# Patient Record
Sex: MALE | Race: WHITE | NOT HISPANIC OR LATINO | Employment: FULL TIME | URBAN - METROPOLITAN AREA
[De-identification: names, ages, dates, MRNs, and addresses within clinical notes are randomized per-mention and may not be internally consistent; named-entity substitution may affect disease eponyms.]

---

## 2017-07-25 ENCOUNTER — CLINICAL SUPPORT (OUTPATIENT)
Dept: OTHER | Facility: CLINIC | Age: 57
End: 2017-07-25
Payer: COMMERCIAL

## 2017-07-25 VITALS
DIASTOLIC BLOOD PRESSURE: 84 MMHG | HEIGHT: 73 IN | SYSTOLIC BLOOD PRESSURE: 126 MMHG | BODY MASS INDEX: 37.77 KG/M2 | WEIGHT: 285 LBS

## 2017-07-25 DIAGNOSIS — Z00.8 HEALTH EXAMINATION IN POPULATION SURVEYS: Primary | ICD-10-CM

## 2017-07-25 LAB
GLUCOSE SERPL-MCNC: 145 MG/DL (ref 60–140)
HBA1C MFR BLD: 7.9 % (ref 0–5.7)
POC CHOLESTEROL, HDL: 31 MG/DL (ref 40–?)
POC CHOLESTEROL, LDL: 113 MG/DL (ref ?–160)
POC CHOLESTEROL, TOTAL: 173 MG/DL (ref ?–240)
POC GLUCOSE FASTING: ABNORMAL MG/DL (ref 60–110)
POC TOTAL CHOLESTEROL / HDL RATIO: 5.58 (ref ?–6)
POC TRIGLYCERIDES: 147 MG/DL (ref ?–160)

## 2017-07-25 PROCEDURE — 82947 ASSAY GLUCOSE BLOOD QUANT: CPT | Mod: QW,S$GLB,, | Performed by: INTERNAL MEDICINE

## 2017-07-25 PROCEDURE — 99401 PREV MED CNSL INDIV APPRX 15: CPT | Mod: S$GLB,,, | Performed by: INTERNAL MEDICINE

## 2017-07-25 PROCEDURE — 83036 HEMOGLOBIN GLYCOSYLATED A1C: CPT | Mod: QW,S$GLB,, | Performed by: INTERNAL MEDICINE

## 2017-07-25 PROCEDURE — 80061 LIPID PANEL: CPT | Mod: QW,S$GLB,, | Performed by: INTERNAL MEDICINE

## 2024-01-19 ENCOUNTER — TELEPHONE (OUTPATIENT)
Dept: TRANSPLANT | Facility: CLINIC | Age: 64
End: 2024-01-19
Payer: COMMERCIAL

## 2024-01-22 ENCOUNTER — TELEPHONE (OUTPATIENT)
Dept: TRANSPLANT | Facility: CLINIC | Age: 64
End: 2024-01-22
Payer: COMMERCIAL

## 2024-01-24 ENCOUNTER — TELEPHONE (OUTPATIENT)
Dept: TRANSPLANT | Facility: CLINIC | Age: 64
End: 2024-01-24
Payer: COMMERCIAL

## 2024-03-05 ENCOUNTER — TELEPHONE (OUTPATIENT)
Dept: TRANSPLANT | Facility: CLINIC | Age: 64
End: 2024-03-05
Payer: COMMERCIAL

## 2024-03-05 DIAGNOSIS — Z76.82 ORGAN TRANSPLANT CANDIDATE: Primary | ICD-10-CM

## 2024-03-05 DIAGNOSIS — Z91.89 CARDIOVASCULAR EVENT RISK: ICD-10-CM

## 2024-03-05 NOTE — TELEPHONE ENCOUNTER
Returned call, unable to leave voice message at number provided.  ----- Message from Jessie Valdez sent at 3/5/2024  8:19 AM CST -----  Regarding: speak to Nurse  Contact: PT  wife   The patient wife called requesting to speak to Nurse regarding wife being initial point of contact. PT struggling with some of the issues with diagnosis, etc. Please call at your earliest convenience     No further information provided    Patient wife can be contacted @# [phone]

## 2024-03-08 ENCOUNTER — TELEPHONE (OUTPATIENT)
Dept: TRANSPLANT | Facility: CLINIC | Age: 64
End: 2024-03-08
Payer: COMMERCIAL

## 2024-03-14 ENCOUNTER — HOSPITAL ENCOUNTER (OUTPATIENT)
Dept: RADIOLOGY | Facility: HOSPITAL | Age: 64
Discharge: HOME | End: 2024-03-14
Payer: COMMERCIAL

## 2024-03-14 ENCOUNTER — OFFICE VISIT (OUTPATIENT)
Dept: TRANSPLANT | Facility: CLINIC | Age: 64
End: 2024-03-14
Payer: COMMERCIAL

## 2024-03-14 ENCOUNTER — DOCUMENTATION ONLY (OUTPATIENT)
Dept: TRANSPLANT | Facility: CLINIC | Age: 64
End: 2024-03-14

## 2024-03-14 VITALS
HEART RATE: 56 BPM | BODY MASS INDEX: 35.28 KG/M2 | RESPIRATION RATE: 18 BRPM | TEMPERATURE: 98 F | SYSTOLIC BLOOD PRESSURE: 134 MMHG | DIASTOLIC BLOOD PRESSURE: 63 MMHG | OXYGEN SATURATION: 99 % | WEIGHT: 252 LBS | HEIGHT: 71 IN

## 2024-03-14 DIAGNOSIS — N18.4 ANEMIA IN STAGE 4 CHRONIC KIDNEY DISEASE: Chronic | ICD-10-CM

## 2024-03-14 DIAGNOSIS — Z76.82 ORGAN TRANSPLANT CANDIDATE: ICD-10-CM

## 2024-03-14 DIAGNOSIS — N18.4 BENIGN HYPERTENSION WITH CKD (CHRONIC KIDNEY DISEASE) STAGE IV: ICD-10-CM

## 2024-03-14 DIAGNOSIS — N25.81 SECONDARY HYPERPARATHYROIDISM OF RENAL ORIGIN: Chronic | ICD-10-CM

## 2024-03-14 DIAGNOSIS — Z86.718 HISTORY OF DVT (DEEP VEIN THROMBOSIS): ICD-10-CM

## 2024-03-14 DIAGNOSIS — E08.22 DIABETES MELLITUS DUE TO UNDERLYING CONDITION WITH STAGE 4 CHRONIC KIDNEY DISEASE, WITH LONG-TERM CURRENT USE OF INSULIN: ICD-10-CM

## 2024-03-14 DIAGNOSIS — Z86.711 HISTORY OF PULMONARY EMBOLISM: ICD-10-CM

## 2024-03-14 DIAGNOSIS — Z01.818 PRE-TRANSPLANT EVALUATION FOR CHRONIC KIDNEY DISEASE: Primary | ICD-10-CM

## 2024-03-14 DIAGNOSIS — I12.9 BENIGN HYPERTENSION WITH CKD (CHRONIC KIDNEY DISEASE) STAGE IV: ICD-10-CM

## 2024-03-14 DIAGNOSIS — E78.2 MIXED HYPERLIPIDEMIA: ICD-10-CM

## 2024-03-14 DIAGNOSIS — I73.9 PVD (PERIPHERAL VASCULAR DISEASE): ICD-10-CM

## 2024-03-14 DIAGNOSIS — I48.0 PAROXYSMAL ATRIAL FIBRILLATION: ICD-10-CM

## 2024-03-14 DIAGNOSIS — N18.4 CHRONIC KIDNEY DISEASE, STAGE 4 (SEVERE): Chronic | ICD-10-CM

## 2024-03-14 DIAGNOSIS — E66.09 CLASS 1 OBESITY DUE TO EXCESS CALORIES WITH SERIOUS COMORBIDITY AND BODY MASS INDEX (BMI) OF 34.0 TO 34.9 IN ADULT: ICD-10-CM

## 2024-03-14 DIAGNOSIS — N18.4 DIABETES MELLITUS DUE TO UNDERLYING CONDITION WITH STAGE 4 CHRONIC KIDNEY DISEASE, WITH LONG-TERM CURRENT USE OF INSULIN: ICD-10-CM

## 2024-03-14 DIAGNOSIS — Z79.4 DIABETES MELLITUS DUE TO UNDERLYING CONDITION WITH STAGE 4 CHRONIC KIDNEY DISEASE, WITH LONG-TERM CURRENT USE OF INSULIN: ICD-10-CM

## 2024-03-14 DIAGNOSIS — D63.1 ANEMIA IN STAGE 4 CHRONIC KIDNEY DISEASE: Chronic | ICD-10-CM

## 2024-03-14 PROBLEM — N18.9 ANEMIA IN CHRONIC KIDNEY DISEASE: Chronic | Status: ACTIVE | Noted: 2023-07-10

## 2024-03-14 PROBLEM — E66.811 CLASS 1 OBESITY DUE TO EXCESS CALORIES WITH SERIOUS COMORBIDITY AND BODY MASS INDEX (BMI) OF 34.0 TO 34.9 IN ADULT: Status: ACTIVE | Noted: 2024-03-14

## 2024-03-14 PROBLEM — R60.0 PERIPHERAL EDEMA: Status: ACTIVE | Noted: 2019-09-03

## 2024-03-14 PROBLEM — R80.9 PROTEINURIA: Status: ACTIVE | Noted: 2019-09-03

## 2024-03-14 PROCEDURE — 93978 VASCULAR STUDY: CPT | Mod: TC,TXP

## 2024-03-14 PROCEDURE — 93978 VASCULAR STUDY: CPT | Mod: 26,TXP,, | Performed by: RADIOLOGY

## 2024-03-14 PROCEDURE — 72170 X-RAY EXAM OF PELVIS: CPT | Mod: TC,TXP

## 2024-03-14 PROCEDURE — 76770 US EXAM ABDO BACK WALL COMP: CPT | Mod: TC,TXP

## 2024-03-14 PROCEDURE — 71046 X-RAY EXAM CHEST 2 VIEWS: CPT | Mod: TC,TXP

## 2024-03-14 PROCEDURE — 99999 PR PBB SHADOW E&M-EST. PATIENT-LVL IV: CPT | Mod: PBBFAC,TXP,, | Performed by: NURSE PRACTITIONER

## 2024-03-14 PROCEDURE — 1160F RVW MEDS BY RX/DR IN RCRD: CPT | Mod: CPTII,S$GLB,TXP, | Performed by: NURSE PRACTITIONER

## 2024-03-14 PROCEDURE — 3078F DIAST BP <80 MM HG: CPT | Mod: CPTII,S$GLB,TXP, | Performed by: NURSE PRACTITIONER

## 2024-03-14 PROCEDURE — 99204 OFFICE O/P NEW MOD 45 MIN: CPT | Mod: S$GLB,TXP,, | Performed by: PHYSICIAN ASSISTANT

## 2024-03-14 PROCEDURE — 76770 US EXAM ABDO BACK WALL COMP: CPT | Mod: 26,TXP,, | Performed by: RADIOLOGY

## 2024-03-14 PROCEDURE — 3044F HG A1C LEVEL LT 7.0%: CPT | Mod: CPTII,S$GLB,TXP, | Performed by: NURSE PRACTITIONER

## 2024-03-14 PROCEDURE — 72170 X-RAY EXAM OF PELVIS: CPT | Mod: 26,TXP,, | Performed by: RADIOLOGY

## 2024-03-14 PROCEDURE — 99204 OFFICE O/P NEW MOD 45 MIN: CPT | Mod: S$GLB,TXP,, | Performed by: TRANSPLANT SURGERY

## 2024-03-14 PROCEDURE — 97802 MEDICAL NUTRITION INDIV IN: CPT | Mod: S$GLB,TXP,,

## 2024-03-14 PROCEDURE — 3008F BODY MASS INDEX DOCD: CPT | Mod: CPTII,S$GLB,TXP, | Performed by: NURSE PRACTITIONER

## 2024-03-14 PROCEDURE — 71046 X-RAY EXAM CHEST 2 VIEWS: CPT | Mod: 26,TXP,, | Performed by: RADIOLOGY

## 2024-03-14 PROCEDURE — 1159F MED LIST DOCD IN RCRD: CPT | Mod: CPTII,S$GLB,TXP, | Performed by: NURSE PRACTITIONER

## 2024-03-14 PROCEDURE — 3075F SYST BP GE 130 - 139MM HG: CPT | Mod: CPTII,S$GLB,TXP, | Performed by: NURSE PRACTITIONER

## 2024-03-14 PROCEDURE — 99205 OFFICE O/P NEW HI 60 MIN: CPT | Mod: S$GLB,TXP,, | Performed by: NURSE PRACTITIONER

## 2024-03-14 RX ORDER — SERTRALINE HYDROCHLORIDE 50 MG/1
1 TABLET, FILM COATED ORAL DAILY
COMMUNITY
Start: 2023-09-14 | End: 2025-02-07

## 2024-03-14 RX ORDER — FUROSEMIDE 40 MG/1
40 TABLET ORAL DAILY
COMMUNITY
Start: 2023-06-05

## 2024-03-14 RX ORDER — APIXABAN 5 MG/1
5 TABLET, FILM COATED ORAL 2 TIMES DAILY
COMMUNITY

## 2024-03-14 RX ORDER — INSULIN LISPRO 100 [IU]/ML
INJECTION, SOLUTION INTRAVENOUS; SUBCUTANEOUS
COMMUNITY
Start: 2023-07-13

## 2024-03-14 RX ORDER — AMLODIPINE BESYLATE 5 MG/1
5 TABLET ORAL DAILY
COMMUNITY

## 2024-03-14 RX ORDER — TRAZODONE HYDROCHLORIDE 50 MG/1
1 TABLET ORAL NIGHTLY
COMMUNITY
Start: 2024-02-08

## 2024-03-14 NOTE — PROGRESS NOTES
INITIAL PATIENT EDUCATION NOTE     Mr. Bhupendra Rojas was seen in pre-kidney transplant clinic for evaluation for kidney, kidney/pancreas or pancreas only transplant.  The patient attended an individual video education session that discussed/reviewed the following aspects of transplantation: evaluation including diagnostic and laboratory testing,(Chemistries, Hematology, Serologies including HIV and Hepatitis and HLA) required for transplantation and selection committee process, UNOS waitlist management/multiple listings, types of organs offered (KDPI < 85%, KDPI > 85%, PHS risk, DCD, HCV+, HIV+ for HIV+ recipients and enbloc/dual), financial aspects, surgical procedures, dietary instruction pre- and post-transplant, health maintenance pre- and post-transplant, post-transplant hospitalization and outpatient follow-up, potential to participate in a research protocol, and medication management and side effects.  A question and answer session was provided after the presentation.    The patient was seen by all members of the multi-disciplinary team to include: Nephrologist/TRACEY, Surgeon, , Transplant Coordinator, , Pharmacist and Dietician (if applicable).    The patient reviewed and signed all consents for evaluation which were witnessed and sent to scanning into the [facility] chart.    The patient was given an education book and plan for further evaluation based on his individual assessment.      Reviewed program requirement for complete COVID vaccination with documentation prior to listing.  COVID education information reviewed with patient. Patient encouraged to be up to date on all vaccinations.     The patient was informed that the transplant team would manage immediate post op pain. If the patient requires long term pain management, they will need to have that pain management addressed by their PCP or previous provider who wrote for long term pain medicines.    The patient was encouraged  to call with any questions or concerns.

## 2024-03-14 NOTE — PROGRESS NOTES
PHARM.D. PRE-TRANSPLANT NOTE:    This patient's medication therapy was evaluated as part of his pre-transplant evaluation.      The following general pharmacologic concerns were noted: Eliquis for hx of thrombus and AFib    The following concerns for post-operative pain management were noted: none    The following pharmacologic concerns related to HCV therapy were noted: AFib hx      This patient's medication profile was reviewed for considerations for DAA Hepatitis C therapy:    [x]  No current inducers of CYP 3A4 or PGP  [x]  No amiodarone on this patient's EMR profile in the last 24 months  [YES]  No past or current atrial fibrillation on this patient's EMR profile       Current Outpatient Medications   Medication Sig Dispense Refill    amLODIPine (NORVASC) 5 MG tablet Take 5 mg by mouth once daily.      ELIQUIS 5 mg Tab Take 5 mg by mouth 2 (two) times daily.      furosemide (LASIX) 40 MG tablet Take 40 mg by mouth once daily.      HUMALOG KWIKPEN INSULIN 100 unit/mL pen Sliding scale      sertraline (ZOLOFT) 50 MG tablet Take 1 tablet by mouth once daily.      traZODone (DESYREL) 50 MG tablet Take 1 tablet by mouth every evening.       No current facility-administered medications for this visit.           I am available for consultation and can be contacted, as needed by the other members of the Transplant team.

## 2024-03-14 NOTE — PROGRESS NOTES
Transplant Recipient Adult Psychosocial Assessment    SW completed assessment with patient and pt's wife Leigh Rojas in clinic.    Bhupendra Rojas  [address]  [facility] [address]  Telephone Information:   Mobile [phone]   Home  [phone] (home)  Work  There is no work phone number on file.  E-mail  [email or web address]    Sex: male  YOB: 1960  Age: 63 y.o.    Encounter Date: 3/14/2024  [place] Citizen: yes  Primary Language: English   Needed: no    Emergency Contact:  Name: Leigh Rojas  Relationship: wife  Address: same as patient  Phone Numbers: [phone] (mobile)    Family/Social Support:   Number of dependents/: Patient denies any dependents.  Marital history: Patient reports being  to wife Leigh for 34 years.  Other family dynamics: Patient's parents are .  Patient has 2 sisters who are supportive - one lives in [place], [place] and one lives in [place], [place].  Patient has 2 adult daughters who both live with patient and are available to provide secondary caregiver support.    Household Composition:  Name: Jj Rojas  Age: 61 & 59  Relationship: patient and wife  Does person drive? yes    Name: Shahrzad Rojas  Age: 32 & 29  Relationship: daughters  Does person drive? yes    Name: Cristasocrates Rosenbergley ([phone])  Age: 31  Relationship:  son-in-law  Does person drive? yes    Do you and your caregivers have access to reliable transportation? yes  PRIMARY CAREGIVER: Leigh Rojas, pt's wife, will be primary caregiver, phone number [phone].      provided in-depth information to patient and caregiver regarding pre- and post-transplant caregiver role.   strongly encourages patient and caregiver to have concrete plan regarding post-transplant care giving, including back-up caregiver(s) to ensure care giving needs are met as needed.    Patient and Caregiver states understanding all aspects of caregiver  role/commitment and is able/willing/committed to being caregiver to the fullest extent necessary.    Patient and Caregiver verbalizes understanding of the education provided today and caregiver responsibilities.         remains available. Patient and Caregiver agree to contact  in a timely manner if concerns arise.      Able to take time off work without financial concerns: yes.     Additional Significant Others who will Assist with Transplant:  Name: Avril Rojas ([phone])  Age: 32  City:  State: [place]  Relationship: daughter  Does person drive? yes    Name: Radha Rojas ([phone])  Age: 31  City: [place] State: [place]  Relationship: daughter  Does person drive? yes    Living Will: yes  Healthcare Power of : no  Advance Directives on file: <<no information> per medical record.  Verbally reviewed LW/HCPA information.   provided patient with copy of LW/HCPA documents and provided education on completion of forms.    Living Donors: No. Education and resource information given to patient.    Highest Education Level: Associate/Bachelor Degree  Reading Ability: college  Reports difficulty with: N/A  Learns Best By:  multisensory information     Status: no  VA Benefits: no     Working for Income: No  If no, reason not working: Patient Choice - Retired  Patient is retired from working as a  at [employer] in [place].    Spouse/Significant Other Employment: Patient's wife Leigh works full-time (night shifts) as an .    Disabled: no    Monthly Income:  Salary/Wages: $1,600 (wife's employment income)  care home: $700 (patient's snf income)  Able to afford all costs now and if transplanted, including medications: yes - Patient and wife report having financial assistance available from children if needed.  Patient and Caregiver verbalizes understanding of personal responsibilities related to transplant costs and the  importance of having a financial plan to ensure that patients transplant costs are fully covered.      provided fundraising information/education.  Patient and Caregiver verbalizes understanding.   remains available.    Insurance:   Payer/Plan Subscr  Sex Relation Sub. Ins. ID Effective Group Num   1. BELIA CROSS BL* MODESTA JAIMES 1960 Male Self [id number]                                   [address] [address]     Primary Insurance (for UNOS reporting): Private Insurance - BCBS (via patient's jail benefit)  Secondary Insurance (for UNOS reporting): None  Patient and Caregiver verbalizes clear understanding that patient may experience difficulty obtaining and/or be denied insurance coverage post-surgery. This includes and is not limited to disability insurance, life insurance, health insurance, burial insurance, long term care insurance, and other insurances.    Patient and Caregiver also reports understanding that future health concerns related to or unrelated to transplantation may not be covered by patient's insurance.  Resources and information provided and reviewed.      Patient and Caregiver provides verbal permission to release any necessary information to outside resources for patient care and discharge planning.  Resources and information provided are reviewed.      Dialysis Adherence:  Patient reports being pre-dialysis.  Nephrology adherence form faxed to patient's nephrologist's office.  SW will await returned form to complete dialysis adherence check.    Infusion Service: patient utilizing? no  Home Health: patient utilizing? no  DME: yes - CPAP  Pulmonary/Cardiac Rehab: no   ADLS:  Pt reports being independent with all ADLs and mobility and driving himself.    Adherence:   Pt/caregiver reports pt has exhibited good adherence to medication regimen, appointment schedule, and medical recommendations in the past.  Adherence education and counseling  provided.     Per History Section:  Past Medical History:   Diagnosis Date    Atrial fibrillation     Deep vein thrombosis     Diabetes mellitus     Diabetes mellitus, type 2     Disorder of kidney and ureter     Empyema     Kidney stone     Onychomycosis     Paroxysmal atrial fibrillation     Personal history of colonic polyps     Pleural effusion     due to bacterial infection    Pulmonary embolism     Sleep apnea, unspecified     Solitary kidney, acquired     Tinea pedis     Both feet    Type 2 diabetes mellitus with unspecified diabetic retinopathy without macular edema      Social History     Tobacco Use    Smoking status: Never    Smokeless tobacco: Never   Substance Use Topics    Alcohol use: Not Currently     Social History     Substance and Sexual Activity   Drug Use Never     Social History     Substance and Sexual Activity   Sexual Activity Not Currently       Per Today's Psychosocial:  Tobacco: none, patient denies any use.  Alcohol: rare - glass of wine on special occasions  Illicit Drugs/Non-prescribed Medications: none, patient denies any use.    Patient and Caregiver states clear understanding of the potential impact of substance use as it relates to transplant candidacy and is aware of possible random substance screening.  Substance abstinence/cessation counseling, education and resources provided and reviewed.     Arrests/DWI/Treatment/Rehab: patient denies    Psychiatric History:    Mental Health: Patient reports experiencing anxiety and depression symptoms following medical snf from teaching, nephrectomy 2/2 kidney stones, and the loss of his father.  Patient reports deciding to seek help from  providers, and symptoms are now being adequately managed via medication and routine follow-up with psychiatrist and psychotherapist.  Psychiatrist/Counselor: Patient reports seeing psychiatrist Dr. James aLndry with [facility] for medication management every 3 months.  Pt also  sees  Jenn Carbajal LCSW with [facility] for psychotherapy every month.  Medications:  Patient reports currently taking Zoloft prescribed by psychiatrist.  Suicide/Homicide Issues: Pt denies any past and/or present SI/HI.   Safety at home: Pt denies having any past or present concerns regarding safety at home including but not limited to physical/emotional/sexual abuse, neglect, or exploitation.    Knowledge: Patient and Caregiver states having clear understanding and realistic expectations regarding the potential risks and potential benefits of organ transplantation and organ donation, agrees to discuss with health care team members and support system members, and to utilize available resources and express questions and/or concerns in order to further facilitate the pt informed decision-making.  Resources and information provided and reviewed.     Patient and Caregiver is aware of [facility]'s affiliation and/or partnership with agencies in home health care, LTAC, SNF, [facility], and other hospitals and clinics.    Understanding: Patient and Caregiver reports having a clear understanding of the many lifetime commitments involved with being a transplant recipient, including costs, compliance, medications, lab work, procedures, appointments, concrete and financial planning, preparedness, timely and appropriate communication of concerns, abstinence (ETOH, tobacco, illicit non-prescribed drugs), adherence to all health care team recommendations, support system and caregiver involvement, appropriate and timely resource utilization and follow-through, mental health counseling as needed/recommended, and patient and caregiver responsibilities.  Social Service Handbook, resources and detailed educational information provided and reviewed.  Educational information provided.    Patient and Caregiver also reports current and expected compliance with health care regime and states having a clear understanding of the  "importance of compliance.      Patient and Caregiver reports a clear understanding that risks and benefits may be involved with organ transplantation and with organ donation.      Patient and Caregiver also reports clear understanding that psychosocial risk factors may affect patient, and include but are not limited to feelings of depression, generalized anxiety, anxiety regarding dependence on others, post traumatic stress disorder, feelings of guilt and other emotional and/or mental concerns, and/or exacerbation of existing mental health concerns.  Detailed resources provided and discussed.     Patient and Caregiver agrees to access appropriate resources in a timely manner as needed and/or as recommended, and to communicate concerns appropriately.  Patient and Caregiver also reports a clear understanding of treatment options available.      reviewed education, provided additional information, and answered questions.    Feelings or Concerns: Pt expresses motivation to continue pursuing transplant and denies any transplant related concerns at this time.    Coping: Identify Patient & Caregiver Strategies to [place]:   1. In the past - playing [employer]; reading; activity in [employer]   2. Currently & Pre-transplant - watching [employer] videos; reading; tico/[employer] ([facility] [employer])   3. At the time of surgery - family support; tico   4. During post-Transplant & Recovery Period - all of the above    Goals: Patient reports post-transplant goals include improving physical activity / exercise, sleeping better, and returning to a more "normal" life.  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  Patient referred to Vocational Rehabilitation.    Interview Behavior: Patient and Caregiver presents as alert and oriented x 4, pleasant, good eye contact, well groomed, recall good, concentration/judgement good, average intelligence, communicative, cooperative, and asking and answering questions " appropriately.  Pt accompanied to clinic by wife Leigh who presents as highly supportive and actively involved in pt's current healthcare regimen.         Transplant Social Work - Candidacy  Assessment/Plan:     Psychosocial Suitability: Based on psychosocial risk factors, patient presents as  low risk candidate for kidney transplant at this time due to established caregiver plan, supportive family system, no reported history of substance abuse, adequate management of mental health concerns, healthy and effective coping strategies, adequate insurance coverage and personal finances to cover transplant cost, and realistic beliefs and expectations regarding risks and benefits of transplant.    Final determination of transplant candidacy to be made by Transplant Selection Committee.    Recommendations/Additional Comments: Nephrology adherence update is pending.  SW recommends that pt conduct fundraising to assist pt with pay for cost of medications, food, gas, and other transplant related needs.  SW recommends that pt remain aware of potential mental health concerns and contact the team if any concerns arise.  SW recommends that pt remain abstinent from tobacco, ETOH, and drug use.  SW supports pt's continued adherence. SW remains available to answer any questions or concerns that arise as the pt moves through the transplant process.     Mau Zambrano

## 2024-03-14 NOTE — LETTER
March 18, 2024        Lisset Hull  [address]  [place] [address]  Phone: [phone]  Fax: [phone]             Kade Corley- Transplant [facility]  [address]  [facility] [address]  Phone: [phone]   Patient: Bhupendra Rojas   MR Number: [id number]   YOB: 1960   Date of Visit: 3/14/2024       Dear Dr. Lisset Hull    Thank you for referring Bhupendra Rojas to me for evaluation. Attached you will find relevant portions of my assessment and plan of care.    If you have questions, please do not hesitate to call me. I look forward to following Bhupendra Rojas along with you.    Sincerely,    Claudia Panchal, NP    Enclosure    If you would like to receive this communication electronically, please contact [email or web address] or [phone] to request [facility] [employer] access.    [facility] [employer] is a tool which provides read-only access to select patient information with whom you have a relationship. Its easy to use and provides real time access to review your patients record including encounter summaries, notes, results, and demographic information.    If you feel you have received this communication in error or would no longer like to receive these types of communications, please e-mail [email or web address]

## 2024-03-14 NOTE — PROGRESS NOTES
PRE-TRANSPLANT INFECTIOUS DISEASE CONSULT    Reason for Visit:  Pre-transplant evaluation  Referring Provider: Lisset Hull     History of Present Illness:    63 y.o. male with a history of ESRD 2/2 DMII presents for pre-kidney transplant evaluation. Pt is pre dialysis. Pt has LUE AVF 12/2023.     Infectious History:  Recent hospital admissions: No  Recent infections: Yes  Recent or current antibiotic use: No. Pt was on augmentin for diabetic foot infection left foot.   History of recurrent infections *(sinus / pneumonia / UTI / SBP)*: No  History of diabetic foot wound or bone/joint infection: Yes treated with po abx through the podiatrist. Ulcer well healed. Has f/u with podiatry Monday   Recent dental infections, issues or procedures: No  History of chicken pox: Yes  History of shingles: No  History of STI: No  History of COVID infection: No    History of Immunosuppression:  Prior chemotherapy / immunosuppression: No  Prior transplant: No  History of splenectomy: No    Tuberculosis:  Prior screening for latent TB: Yes  Prior diagnosis of latent TB: No  Risk factors for TB *known exposure, incarceration, homelessness*: No    Geographical exposures:  Currently lives in [place] with spouse   Lived in the following states: [place] [address] x 1 year,  [place], [place] ([age])  Lived or travelled to the [facility] [place]: No  International travel: Yes, [place], [place]   Travel-associated illness: No    Social/Environmental:  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  Yes raw oysters, raw fish   Tobacco: No  Alcohol: Yes  Recreational drug use:  No  Sexual partners: spouse       Past Histories:   Past Medical History:   Diagnosis Date    Anxiety     Atrial fibrillation     Deep vein thrombosis      Diabetes mellitus     Diabetes mellitus, type 2     Disorder of kidney and ureter     Empyema     Hyperlipidemia     Hypertension     Kidney stone     Obesity     Onychomycosis     Paroxysmal atrial fibrillation     Personal history of colonic polyps     Pleural effusion     due to bacterial infection    Pulmonary embolism     Sleep apnea, unspecified     Solitary kidney, acquired     Tinea pedis     Both feet    Type 2 diabetes mellitus with unspecified diabetic retinopathy without macular edema      Past Surgical History:   Procedure Laterality Date    COLON SURGERY      COLONOSCOPY      ESOPHAGOGASTRODUODENOSCOPY      LEG TENDON SURGERY      LUNG DECORTICATION      NEPHRECTOMY Left     1997    UPPER GASTROINTESTINAL ENDOSCOPY       Family History   Problem Relation Age of Onset    Hypertension Mother     Heart disease Mother     Diabetes Mother     Diabetes Father     Esophageal cancer Father     Cancer Father     Diabetes Maternal Grandmother     Stroke Maternal Grandmother     Diabetes Paternal Grandmother     Cancer Paternal Grandmother     Breast cancer Paternal Grandmother     Heart disease Paternal Grandfather     Diabetes Paternal Grandfather     Cancer Paternal Grandfather     Lung cancer Paternal Grandfather      Social History     Tobacco Use    Smoking status: Never    Smokeless tobacco: Never   Substance Use Topics    Alcohol use: Not Currently    Drug use: Never     Review of patient's allergies indicates:  No Known Allergies      Immunization History:  Received all childhood vaccines: Yes  All household members receive annual flu vaccine: Yes  All household members are up to date on COVID vaccine: Yes      Current antibiotics:  Antibiotics (From admission, onward)      None              Review of Systems  Review of Systems   Constitutional: Negative for chills, decreased appetite, fever, malaise/fatigue, night sweats, weight gain and weight loss.   HENT:  Negative for congestion, ear pain, hearing  loss, hoarse voice, sore throat and tinnitus.    Eyes:  Negative for blurred vision, pain, vision loss in left eye, vision loss in right eye and visual disturbance.   Cardiovascular:  Negative for chest pain, dyspnea on exertion, leg swelling and palpitations.   Respiratory:  Negative for cough, shortness of breath, sputum production and wheezing.    Skin:  Negative for dry skin, itching, rash and suspicious lesions.   Musculoskeletal:  Negative for back pain, joint pain, myalgias and neck pain.   Gastrointestinal:  Negative for abdominal pain, constipation, diarrhea, heartburn, nausea and vomiting.   Genitourinary:  Negative for dysuria, flank pain, frequency, hematuria, hesitancy and urgency.   Neurological:  Negative for dizziness, headaches, numbness, paresthesias and weakness.   Psychiatric/Behavioral:  Negative for depression and memory loss. The patient does not have insomnia and is not nervous/anxious.           Objective  Physical Exam  Vitals and nursing note reviewed.   Constitutional:       General: He is not in acute distress.     Appearance: He is well-developed. He is not diaphoretic.   HENT:      Head: Normocephalic and atraumatic.   Eyes:      Pupils: Pupils are equal, round, and reactive to light.   Cardiovascular:      Rate and Rhythm: Normal rate and regular rhythm.      Heart sounds: Normal heart sounds. No murmur heard.     No friction rub. No gallop.   Pulmonary:      Effort: Pulmonary effort is normal. No respiratory distress.      Breath sounds: Normal breath sounds. No wheezing or rales.   Chest:      Chest wall: No tenderness.   Abdominal:      General: Bowel sounds are normal. There is no distension.      Palpations: There is no mass.      Tenderness: There is no abdominal tenderness. There is no guarding.   Musculoskeletal:         General: No deformity. Normal range of motion.      Cervical back: Normal range of motion and neck supple.      Comments: DEDE MILLER c/d/I    Left foot ulcer  "well healed. No open wounds redness or swelling   Skin:     General: Skin is warm and dry.      Findings: No erythema or rash.   Neurological:      Mental Status: He is alert and oriented to person, place, and time.   Psychiatric:         Behavior: Behavior normal.         Thought Content: Thought content normal.         Judgment: Judgment normal.           Labs:    CBC:   Lab Results   Component Value Date    WBC 7.19 03/14/2024    HGB 12.9 (L) 03/14/2024    HCT 41.2 03/14/2024    MCV 90 03/14/2024     03/14/2024    GRAN 4.6 03/14/2024    GRAN 64.6 03/14/2024    LYMPH 1.8 03/14/2024    LYMPH 24.3 03/14/2024    MONO 0.5 03/14/2024    MONO 7.0 03/14/2024    EOSINOPHIL 3.3 03/14/2024       Syphilis screening: No results found for: "RPR", "PRPQ", "FTAABS"     TB screening: No results found for: "TBGOLDPLUS", "TSPOTSCREN"    HIV screening:   Lab Results   Component Value Date    RPF74IKGV Non-reactive 03/14/2024       Strongyloides IgG: No results found for: "STRONGANTIGG"    Hepatitis Serologies:   Lab Results   Component Value Date    HEPAIGG Non-reactive 03/14/2024    HEPBCAB Non-reactive 03/14/2024    HEPBSAB <3.00 03/14/2024    HEPBSAB Non-reactive 03/14/2024    HEPCAB Non-reactive 03/14/2024        Varicella IgG: No results found for: "VARICELLAINT"      Immunization History   Administered Date(s) Administered    COVID-19, MRNA, LN-S, PF (Pfizer) (Purple Cap) 03/06/2021, 03/27/2021    COVID-19, mRNA, LNP-S, PF, andreia-sucrose, 30 mcg/0.3 mL (Pfizer 2023 Ages 12+) 01/15/2024          Assessment and Plan    1. Risks of Infection: Available serologies were reviewed. No unusual risks of infection or significant barriers to transplantation were identified from the infectious disease standpoint given the information available at this time.    - Acute infectious issues: None   - Pending serologies: HIV, Quantiferon gold / T-spot, RPR, Strongyloides IgG, and VZV IgG   - Please call if any pending serologic testing is " positive.    2. Immunizations:  Based on the patient's immunization history and serologies, the following immunizations are recommended:  - Hepatitis A    Patient does not have immunity to hepatitis A    Vaccination ordered today: Yes   - Hepatitis B    Patient does not have immunity to hepatitis B    Vaccination ordered today: Yes   - COVID    Current [facility] vaccination recommendations were discussed with the patient   - Annual high dose influenza     Vaccination ordered today: No. Reason for not ordering: vaccination up to date   - Prevnar 20    Vaccination ordered today: No. Reason for not ordering: vaccination up to date   - Tdap    Vaccination ordered today: No. Reason for not ordering: vaccination up to date   - Shingrix    Vaccination ordered today: Yes    Recommended Pre-Transplant Immunization Schedule   Vaccine  0m 1m 2m 6m   Pneumococcal conjugate vaccine (Prevnar 20) X      Tetanus-diphtheria-pertussis (Tdap)* X      Hepatitis A Vaccine (Havrix)** X   X   Hepatitis B Vaccine (Heplisav)** X X     Influenza (annual) X      Zoster Recombinant Vaccine (Shingrix) X  X           *Administer booster every 10 years.       **Administer if no immunity demonstrated on serologies               Patient will receive vaccines at local pharmacy. A written prescription was provided for all vaccine doses.     3. Counseling:   I discussed with the patient the risk for increased susceptibility to infections following transplantation including increased risk for infection right after transplant and if rejection should occur.  The patient has been counseled on the importance of vaccinations to decrease risk of infection and severe illness. Specific guidance has been provided to the patient regarding the patient's occupation, hobbies and activities to avoid future infectious complications.     4. Transplant Candidacy: Based on available information, there are no identified significant barriers to transplantation from an  infectious disease standpoint.  Final determination of transplant candidacy will be made once evaluation is complete and reviewed by the Selection Committee.      Follow up with infectious disease as needed.       The total time for evaluation and management services performed on 03/14/2024 was greater than 35 minutes.

## 2024-03-14 NOTE — PROGRESS NOTES
Transplant Nephrology  Kidney Transplant Recipient Evaluation    Referring Physician: Lisset Hull  Current Nephrologist: Lisset Hull    Subjective:   CC:  Initial evaluation of kidney transplant candidacy.    HPI:  Mr. Rojas is a 63 y.o. year old White male who has presented to be evaluated as a potential kidney transplant recipient.  He has advanced kidney disease secondary to diabetic nephropathy.  Patient is currently pre-dialysis. He has a LUE AV fistula for dialysis access.     Donors: yes     ESRD HX:   3/11/24 Nephrology note   He was hospitalized in January of 2015 for shortness of breath after being treated for pneumonia. He was found to have a right pleural effusion at that time for which he underwent thoracotomy with decortication of empyema. He developed CARMELITA following this procedure with a peak creatinine of 9.49 for which he did ultimately receive two hemodialysis treatments. His creatinine did improve to 5.77 by discharge. His creatinine had improved then settled in the 3.5-4 range.       Fx assessment: Has not been exercising d/u a healing foot wound. He will do light housework without  problems. He has made dietary changes over the past few months and lost ~ 60 lbs.   Does ot appear frail.     Previous Transplant: no  S/p left nephrectomy 1997 2/2 kidney stones     Diabetes: Type II   Age at Onset of Diabetes:   On insulin:  Humalog 20 UNITS UNDER THE SKIN THREE TIMES DAILY BEFORE MEALS   Retinopathy: yes  Neuropathy: yes    Hypoglycemic unawareness: yes  Amputation: no  Symptomatic Peripheral Vascular Disease: yes  HX diabetic foot wounds --left great toe  LOV podiatry 12/2023--since his wound has healed and he was discharged from wound care   Active foot wounds : denies and will be f/u with podiatry next week and then every 3 months    Lab Results   Component Value Date    HGBA1C 6.5 (H) 03/14/2024       Cardiac HX: Pt reports being told he may need a pacemaker by his cardiologist   HTN, HLD,  A fib- (2018)  AC: On Eliquis  HX DVT of LE  and PE  Local cardiology : Dr Zackary Milton at [facility]       Colon mass/  large polyp in the cecum --2020  S/p Laparoscopic bowel resection/right hemicolectomy        Past Medical and Surgical History: Mr. Rojas  has a past medical history of Anxiety, Atrial fibrillation, Deep vein thrombosis, Diabetes mellitus, Diabetes mellitus, type 2, Disorder of kidney and ureter, Empyema, Hyperlipidemia, Hypertension, Kidney stone, Obesity, Onychomycosis, Paroxysmal atrial fibrillation, Personal history of colonic polyps, Pleural effusion, Pulmonary embolism, Sleep apnea, unspecified, Solitary kidney, acquired, Tinea pedis, and Type 2 diabetes mellitus with unspecified diabetic retinopathy without macular edema.  He has a past surgical history that includes Nephrectomy (Left); Lung decortication; Leg Tendon Surgery; Upper gastrointestinal endoscopy; Colonoscopy; Colon surgery; and Esophagogastroduodenoscopy.    Past Social and Family History: Mr. Rojas reports that he has never smoked. He has never used smokeless tobacco. He reports that he does not currently use alcohol. He reports that he does not use drugs. His family history includes Breast cancer in his paternal grandmother; Cancer in his father, paternal grandfather, and paternal grandmother; Diabetes in his father, maternal grandmother, mother, paternal grandfather, and paternal grandmother; Esophageal cancer in his father; Heart disease in his mother and paternal grandfather; Hypertension in his mother; Lung cancer in his paternal grandfather; Stroke in his maternal grandmother.    Review of Systems   Constitutional:  Positive for fatigue and unexpected weight change. Negative for activity change, appetite change, chills and fever.   HENT:  Negative for congestion, facial swelling, postnasal drip, rhinorrhea, sinus pressure, sore throat and trouble swallowing.    Eyes:  Negative for pain, redness and visual disturbance  "(retinopathy).   Respiratory:  Positive for apnea (hx sleep apnea). Negative for cough, chest tightness, shortness of breath and wheezing.    Cardiovascular:  Positive for palpitations (hx afib) and leg swelling. Negative for chest pain.   Gastrointestinal:  Positive for abdominal distention. Negative for abdominal pain, diarrhea, nausea and vomiting.   Genitourinary:  Negative for dysuria, flank pain and urgency.   Musculoskeletal:  Negative for gait problem, neck pain and neck stiffness.   Skin:  Negative for rash.   Allergic/Immunologic: Negative for environmental allergies, food allergies and immunocompromised state.   Neurological:  Negative for dizziness, weakness, light-headedness and headaches.        Peripheral neuropathy     Hematological:  Bruises/bleeds easily (Eliquis-afib).   Psychiatric/Behavioral:  Positive for sleep disturbance. Negative for agitation and confusion. The patient is not nervous/anxious.         HX ANGELINE       Objective:   Blood pressure 134/63, pulse (!) 56, temperature 97.7 °F (36.5 °C), temperature source Tympanic, resp. rate 18, height 5' 11.34" (1.812 m), weight 114.3 kg (251 lb 15.8 oz), SpO2 99 %.body mass index is 34.81 kg/m².    Physical Exam  Constitutional:       Appearance: Normal appearance. He is well-developed. He is obese.   HENT:      Head: Normocephalic.      Nose: Nose normal.   Eyes:      Conjunctiva/sclera: Conjunctivae normal.      Pupils: Pupils are equal, round, and reactive to light.   Cardiovascular:      Rate and Rhythm: Normal rate and regular rhythm.      Heart sounds: Normal heart sounds.   Pulmonary:      Effort: Pulmonary effort is normal.      Breath sounds: Normal breath sounds.   Abdominal:      General: Bowel sounds are normal. There is distension.      Palpations: Abdomen is soft. There is no hepatomegaly or splenomegaly.   Musculoskeletal:        Arms:       Cervical back: Normal range of motion and neck supple.      Right lower leg: Edema present. " "     Left lower leg: Edema present.        Legs:       Comments: Bilateral peripheral edema +1  LE discoloration    Skin:     General: Skin is warm and dry.   Neurological:      Mental Status: He is alert and oriented to person, place, and time.   Psychiatric:         Behavior: Behavior normal.         Labs:  Lab Results   Component Value Date    WBC 7.19 03/14/2024    HGB 12.9 (L) 03/14/2024    HCT 41.2 03/14/2024     03/14/2024    K 4.3 03/14/2024     03/14/2024    CO2 24 03/14/2024    BUN 57 (H) 03/14/2024    CREATININE 3.3 (H) 03/14/2024    EGFRNORACEVR 20.4 (A) 03/14/2024    GLUCOSE 273 (H) 02/21/2020    CALCIUM 9.2 03/14/2024    PHOS 4.4 03/14/2024    ALBUMIN 3.6 03/14/2024    AST 20 03/14/2024    ALT 15 03/14/2024    .1 (H) 03/14/2024       No results found for: "PREALBUMIN", "BILIRUBINUA", "GGT", "AMYLASE", "LIPASE", "PROTEINUA", "NITRITE", "RBCUA", "WBCUA"    No results found for: "HLAABCTYPE"    Labs were reviewed with the patient.    Assessment:     1. Pre-transplant evaluation for chronic kidney disease    2. Benign hypertension with CKD (chronic kidney disease) stage IV    3. Diabetes mellitus due to underlying condition with stage 4 chronic kidney disease, with long-term current use of insulin    4. Chronic kidney disease, stage 4 (severe)    5. Anemia in stage 4 chronic kidney disease    6. Mixed hyperlipidemia    7. Secondary hyperparathyroidism of renal origin    8. Paroxysmal atrial fibrillation    9. History of DVT (deep vein thrombosis)    10. Class 1 obesity due to excess calories with serious comorbidity and body mass index (BMI) of 34.0 to 34.9 in adult    11. History of pulmonary embolism    12. PVD (peripheral vascular disease)        Plan:     HX HTN, HLD afib, bradycardia : Cardiology clearance, clarification pacemaker needs   Cardiology clearance : [facility]  PVD: add ABIs    Transplant Candidacy:   Based on available information, Mr. Rojas is a " high-risk kidney transplant candidate d/t DM, PVD, HTN, Afib, obesity.   Meets center eligibility for accepting HCV+ donor offer - Yes.  Patient educated on HCV+ donors. Bhupendra is willing to accept HCV+ donor offer - Yes   Patient is a candidate for KDPI > 85 kidney donor offer - No d/t weight   Final determination of transplant candidacy will be made once workup is complete and reviewed by the selection committee.    Patient advised that it is recommended that all transplant candidates, and their close contacts and household members receive Covid vaccination.    UNOS Patient Status  Functional Status: 80% - Normal activity with effort: some symptoms of disease     Claudia Panchal, NP

## 2024-03-14 NOTE — PROGRESS NOTES
Transplant Surgery  Kidney Transplant Recipient Evaluation    Referring Physician: Lisset Hull  Current Nephrologist: Lisset Hull    Subjective:     Reason for Visit: evaluate transplant candidacy    History of Present Illness: Bhupendra Rojas is a 63 y.o. year old male undergoing transplant evaluation.    Dialysis History: Bhupendra is pre-dialysis.      Transplant History: N/A    Etiology of Renal Disease: Diabetes Mellitus - Type II (based on medical records from referral).    External provider notes reviewed: Yes    Review of Systems  Objective:     Physical Exam:  Constitutional:   Vitals reviewed: yes   Well-nourished and well-groomed: yes  Eyes:   Sclerae icteric: no   Extraocular movements intact: yes  GI:    Bowel sounds normal: yes   Tenderness: no    If yes, quadrant/location: not applicable   Palpable masses: no    If yes, quadrant/location: not applicable   Hepatosplenomegaly: no   Ascites: no   Hernia: no    If yes, type/location: not applicable   Surgical scars: yes    If yes, type/location: small midline from colectomy; left flank incision from nephrectomy  Resp:   Effort normal: yes   Breath sounds normal: yes    CV:   Regular rate and rhythm: yes   Heart sounds normal: yes   Femoral pulses normal: yes   Extremities edematous: no  Skin:   Rashes or lesions present: no    If yes, describe:not applicable   Jaundice:: no    Musculoskeletal:   Gait normal: yes   Strength normal: yes  Psych:   Oriented to person, place, and time: yes   Affect and mood normal: yes    Additional comments:  obese abodmen, but flattens out when supine    Diagnostics:  The following labs have been reviewed: CBC  CMP  INR  The following radiology images have been independently reviewed and interpreted: pending    Counseling: We provided Bhupendra Rojas with a group education session today.  We discussed kidney transplantation at length with him, including risks, potential complications, and alternatives in the management of his renal  failure.  The discussion included complications related to anesthesia, bleeding, infection, primary nonfunction, and ATN.  I discussed the typical postoperative course, length of hospitalization, the need for long-term immunosuppression, and the need for long-term routine follow-up.  I discussed living-donor and -donor transplantation and the relative advantages and disadvantages of each.  I also discussed average waiting times for both living donation and  donation.  I discussed national and center-specific survival rates.  I also mentioned the potential benefit of multicenter listing to candidates listed with centers within more than one organ procurement organization.  All questions were answered.    Patient advised that it is recommended that all transplant candidates, and their close contacts and household members receive Covid vaccination.    Final determination of transplant candidacy will be made once evaluation is complete and reviewed by the Kidney & Kidney/Pancreas Selection Committee.    Coronavirus disease (COVID-19) caused by severe acute respiratory virus coronavirus 2 (SARS-C0V 2) is associated with increased mortality in solid organ transplant recipients (SOT) compared to non-transplant patients. Vaccine responses to vaccination are depressed against SARS-CoV2 compared to normal individuals but improve with third vaccination doses. Vaccination prior to SOT provides both the best opportunity for transplant candidates to develop protective immunity and to reduce the risk of serious COVID19 infections post transplantation. Organ transplant candidates at [facility] will be required to receive SARS-CoV-2 vaccination prior to being listed with a an active status, whenever possible. Exceptions will be made for disability related reasons or for sincerely held Caodaism beliefs.          Transplant Surgery - Candidacy   Assessment/Plan:   Bhupendra Rojas is  pre-dialysis with CKD stage 4 (GFR 15-29 mL/min). I see no surgical contraindication to placing a kidney transplant. Based on available information, Bhupendra Rojas is a suitable kidney transplant candidate.     Additional testing to be completed according to the Written Order Guidelines for Adult Pre-kidney and Pancreas Transplant Evaluation (KI-02).  Interpretation of tests and discussion of patient management involves all members of the multidisciplinary transplant team.    Leobardo Nielson Jr, MD

## 2024-03-14 NOTE — PROGRESS NOTES
TRANSPLANT NUTRITIONAL ASSESSMENT    Referring Provider: VALERIANO Cortez     Reason for Visit: Pre-kidney transplant work-up (pre-dialysis)    Age: 63 y.o.  Sex: male    Patient Active Problem List   Diagnosis    Anemia in chronic kidney disease    Benign hypertension with CKD (chronic kidney disease) stage IV    Chronic kidney disease, stage 4 (severe)    Diabetes mellitus due to underlying condition with stage 4 chronic kidney disease, with long-term current use of insulin    Diabetic polyneuropathy    History of DVT (deep vein thrombosis)    Mixed hyperlipidemia    Paroxysmal atrial fibrillation    Peripheral edema    Proteinuria    Secondary hyperparathyroidism of renal origin    Class 1 obesity due to excess calories with serious comorbidity and body mass index (BMI) of 34.0 to 34.9 in adult    History of pulmonary embolism    PVD (peripheral vascular disease)     Past Medical History:   Diagnosis Date    Anxiety     Atrial fibrillation     Deep vein thrombosis     Diabetes mellitus     Diabetes mellitus, type 2     Disorder of kidney and ureter     Empyema     Hyperlipidemia     Hypertension     Kidney stone     Obesity     Onychomycosis     Paroxysmal atrial fibrillation     Personal history of colonic polyps     Pleural effusion     due to bacterial infection    Pulmonary embolism     Sleep apnea, unspecified     Solitary kidney, acquired     Tinea pedis     Both feet    Type 2 diabetes mellitus with unspecified diabetic retinopathy without macular edema      Lab Results   Component Value Date     (H) 03/14/2024    K 4.3 03/14/2024    PHOS 4.4 03/14/2024    CHOL 145 03/14/2024    CHOL 173 07/25/2017    HDL 40 03/14/2024    TRIG 118 03/14/2024    ALBUMIN 3.6 03/14/2024    HGBA1C 6.5 (H) 03/14/2024    CALCIUM 9.2 03/14/2024     Other Pertinent Labs: N/A    Current Outpatient Medications   Medication Sig    amLODIPine (NORVASC) 5 MG tablet Take 5 mg by mouth once daily.    ELIQUIS 5 mg Tab Take 5 mg  "by mouth 2 (two) times daily.    furosemide (LASIX) 40 MG tablet Take 40 mg by mouth once daily.    HUMALOG KWIKPEN INSULIN 100 unit/mL pen Sliding scale    sertraline (ZOLOFT) 50 MG tablet Take 1 tablet by mouth once daily.    traZODone (DESYREL) 50 MG tablet Take 1 tablet by mouth every evening.     No current facility-administered medications for this visit.     Allergies: Patient has no known allergies.    Ht Readings from Last 1 Encounters:   03/14/24 5' 11.34" (1.812 m)     Wt Readings from Last 1 Encounters:   03/14/24 114.3 kg (251 lb 15.8 oz)      BMI: Body mass index is 34.81 kg/m².    Usual Weight: 311 lb   Weight Change/Time: 60 lb intentional wt loss 2/2 watching portion sizes, changing diet habits   Current Diet: regular   Appetite/Current Intake: good   Exercise/Physical Activity: functional in ADLs; not currently active 2/2 toe ulcer for ~6 months   Nutritional/Herbal Supplements: vitamin D   Chewing/Swallowing Problems: none  Symptoms: none    Estimated Kcal Need: 2286 kcal/day (20 kcal/kg)   Estimated Protein Need:  gm/day (0.8-0.9 gm/kg)     Nutritional History:   Pt and caregiver present.     Diet Recall    Morning: [employer] [place] high protein cereal with soy milk and orange or raisins     Midday: apple, orange, and nuts or salad (chicken or imitation crabmeat)     Evening: enjoys salads (joão, cheese, croutons, carrots, cucumber, celery, dressing), chicken, vegetables (greens, green beans, okra, broccoli, cauliflower, cabbage)     Snacks: apples, oranges, nuts, chips occasionally, celery and peanut butter     Desserts: chocolate occasionally    Beverages: 2-3 24 oz water/day, soy milk, sweet tea with stevia, apple juice if sugar is low       Seasonings: herbs and spices, no seasoning blends, very little salt     Restaurants/Fast Food: 1x/month       Nutritional Diagnoses  Problem: food- and nutrition-related knowledge deficit  Etiology: RT lack of previous education on pre-kidney " transplant nutrition recommendations  Symptoms: AEB diet recall and questions from pt    Educational Need? yes  Barriers: none identified  Discussed with: patient and caregiver  Interventions: Patient taught nutrition information regarding Pre-kidney transplant work-up (pre-dialysis). Renal Nutrition Therapy packet reviewed (high/low food sources of K, Phos and protein, low sodium and fluid intake, emphasis on moderate protein intake). Encouraged physical activity daily, regular exercise as tolerated, stay mobile.   Goals/Recommendations: diet adherence  Actions Taken: instruct/provide written information  Patient and/or family comprehend instructions: yes  Outcome: Verbalizes understanding  Monitoring: Contact information provided, will f/u in clinic and communicate with the care team as needed.     Counseling Time: 20 minutes

## 2024-03-25 ENCOUNTER — DOCUMENTATION ONLY (OUTPATIENT)
Dept: TRANSPLANT | Facility: CLINIC | Age: 64
End: 2024-03-25
Payer: COMMERCIAL

## 2024-03-25 ENCOUNTER — TELEPHONE (OUTPATIENT)
Dept: TRANSPLANT | Facility: CLINIC | Age: 64
End: 2024-03-25
Payer: COMMERCIAL

## 2024-03-25 DIAGNOSIS — Z76.82 ORGAN TRANSPLANT CANDIDATE: Primary | ICD-10-CM

## 2024-03-25 NOTE — TELEPHONE ENCOUNTER
Phoned patient left voice mail regarding order sheet being mailed for Cardiology clearance from his Cardiologist in [place], [place] and order for  due to PVD. Also informed will have  to contact him regarding CT ABD/Pelvis to be scheduled in [place]. Phone number left for any further questions.

## 2024-04-15 ENCOUNTER — TELEPHONE (OUTPATIENT)
Dept: CARDIOLOGY | Facility: HOSPITAL | Age: 64
End: 2024-04-15
Payer: COMMERCIAL

## 2024-04-17 ENCOUNTER — HOSPITAL ENCOUNTER (OUTPATIENT)
Dept: RADIOLOGY | Facility: HOSPITAL | Age: 64
Discharge: HOME | End: 2024-04-17
Attending: NURSE PRACTITIONER
Payer: COMMERCIAL

## 2024-04-17 ENCOUNTER — HOSPITAL ENCOUNTER (OUTPATIENT)
Dept: CARDIOLOGY | Facility: HOSPITAL | Age: 64
Discharge: HOME | End: 2024-04-17
Attending: NURSE PRACTITIONER
Payer: COMMERCIAL

## 2024-04-17 VITALS
DIASTOLIC BLOOD PRESSURE: 58 MMHG | HEART RATE: 53 BPM | RESPIRATION RATE: 14 BRPM | HEIGHT: 71 IN | BODY MASS INDEX: 35.14 KG/M2 | WEIGHT: 251 LBS | SYSTOLIC BLOOD PRESSURE: 128 MMHG

## 2024-04-17 VITALS
HEIGHT: 71 IN | HEART RATE: 53 BPM | WEIGHT: 251.13 LBS | DIASTOLIC BLOOD PRESSURE: 58 MMHG | BODY MASS INDEX: 35.16 KG/M2 | SYSTOLIC BLOOD PRESSURE: 128 MMHG

## 2024-04-17 DIAGNOSIS — Z76.82 ORGAN TRANSPLANT CANDIDATE: ICD-10-CM

## 2024-04-17 DIAGNOSIS — Z91.89 CARDIOVASCULAR EVENT RISK: ICD-10-CM

## 2024-04-17 LAB
ASCENDING AORTA: 3.5 CM
AV INDEX (PROSTH): 0.88
AV MEAN GRADIENT: 4 MMHG
AV PEAK GRADIENT: 9 MMHG
AV VALVE AREA BY VELOCITY RATIO: 3.58 CM²
AV VALVE AREA: 3.68 CM²
AV VELOCITY RATIO: 0.86
BSA FOR ECHO PROCEDURE: 2.39 M2
CFR FLOW - ANTERIOR: 0.67
CFR FLOW - INFERIOR: 1.57
CFR FLOW - LATERAL: 1.86
CFR FLOW - MAX: 2.28
CFR FLOW - MIN: 1.23
CFR FLOW - SEPTAL: 1.69
CFR FLOW - WHOLE HEART: 1.45
CV ECHO LV RWT: 0.38 CM
CV STRESS BASE HR: 53 BPM
DIASTOLIC BLOOD PRESSURE: 81 MMHG
DOP CALC AO PEAK VEL: 1.52 M/S
DOP CALC AO VTI: 28.81 CM
DOP CALC LVOT AREA: 4.2 CM2
DOP CALC LVOT DIAMETER: 2.31 CM
DOP CALC LVOT PEAK VEL: 1.3 M/S
DOP CALC LVOT STROKE VOLUME: 106.15 CM3
DOP CALCLVOT PEAK VEL VTI: 25.34 CM
E/E' RATIO: 11.67 M/S
ECHO LV POSTERIOR WALL: 1 CM (ref 0.6–1.1)
EJECTION FRACTION- HIGH: 59 %
EJECTION FRACTION: 55 %
END DIASTOLIC INDEX-HIGH: 155 ML/M2
END DIASTOLIC INDEX-LOW: 91 ML/M2
END SYSTOLIC INDEX-HIGH: 78 ML/M2
END SYSTOLIC INDEX-LOW: 40 ML/M2
FRACTIONAL SHORTENING: 34 % (ref 28–44)
INTERVENTRICULAR SEPTUM: 0.95 CM (ref 0.6–1.1)
IVRT: 60.89 MSEC
LA MAJOR: 6.69 CM
LA MINOR: 6.73 CM
LA WIDTH: 5.09 CM
LEFT ATRIUM SIZE: 4.91 CM
LEFT ATRIUM VOLUME INDEX MOD: 48.8 ML/M2
LEFT ATRIUM VOLUME INDEX: 61.4 ML/M2
LEFT ATRIUM VOLUME MOD: 113.24 CM3
LEFT ATRIUM VOLUME: 142.54 CM3
LEFT INTERNAL DIMENSION IN SYSTOLE: 3.49 CM (ref 2.1–4)
LEFT VENTRICLE DIASTOLIC VOLUME INDEX: 58.25 ML/M2
LEFT VENTRICLE DIASTOLIC VOLUME: 135.14 ML
LEFT VENTRICLE MASS INDEX: 84 G/M2
LEFT VENTRICLE SYSTOLIC VOLUME INDEX: 21.8 ML/M2
LEFT VENTRICLE SYSTOLIC VOLUME: 50.56 ML
LEFT VENTRICULAR INTERNAL DIMENSION IN DIASTOLE: 5.3 CM (ref 3.5–6)
LEFT VENTRICULAR MASS: 193.79 G
LV LATERAL E/E' RATIO: 10.5 M/S
LV SEPTAL E/E' RATIO: 13.13 M/S
MV PEAK E VEL: 1.05 M/S
NUC REST DIASTOLIC VOLUME INDEX: 139
NUC REST EJECTION FRACTION: 61
NUC REST SYSTOLIC VOLUME INDEX: 54
NUC STRESS DIASTOLIC VOLUME INDEX: 152
NUC STRESS EJECTION FRACTION: 70 %
NUC STRESS SYSTOLIC VOLUME INDEX: 46
OHS CV CPX 1 MINUTE RECOVERY HEART RATE: 50 BPM
OHS CV CPX 85 PERCENT MAX PREDICTED HEART RATE MALE: 134
OHS CV CPX MAX PREDICTED HEART RATE: 158
OHS CV CPX PATIENT IS FEMALE: 0
OHS CV CPX PATIENT IS MALE: 1
OHS CV CPX PEAK DIASTOLIC BLOOD PRESSURE: 63 MMHG
OHS CV CPX PEAK HEAR RATE: 48 BPM
OHS CV CPX PEAK RATE PRESSURE PRODUCT: 7056
OHS CV CPX PEAK SYSTOLIC BLOOD PRESSURE: 147 MMHG
OHS CV CPX PERCENT MAX PREDICTED HEART RATE ACHIEVED: 30
OHS CV CPX RATE PRESSURE PRODUCT PRESENTING: 9381
OHS CV RV/LV RATIO: 0.85 CM
PISA TR MAX VEL: 3.49 M/S
PULM VEIN S/D RATIO: 0.18
PV PEAK D VEL: 1.03 M/S
PV PEAK S VEL: 0.19 M/S
RA MAJOR: 5.77 CM
RA PRESSURE ESTIMATED: 3 MMHG
RA WIDTH: 4.44 CM
REST FLOW - ANTERIOR: 0.56 CC/MIN/G
REST FLOW - INFERIOR: 0.48 CC/MIN/G
REST FLOW - LATERAL: 0.59 CC/MIN/G
REST FLOW - MAX: 0.78 CC/MIN/G
REST FLOW - MIN: 0.36 CC/MIN/G
REST FLOW - SEPTAL: 0.49 CC/MIN/G
REST FLOW - WHOLE HEART: 0.53 CC/MIN/G
RETIRED EF AND QEF - SEE NOTES: 47 %
RIGHT VENTRICULAR END-DIASTOLIC DIMENSION: 4.5 CM
RV TB RVSP: 6 MMHG
SINUS: 4.5 CM
STJ: 3 CM
STRESS FLOW - ANTERIOR: 0.93 CC/MIN/G
STRESS FLOW - INFERIOR: 0.75 CC/MIN/G
STRESS FLOW - LATERAL: 1.08 CC/MIN/G
STRESS FLOW - MAX: 1.39 CC/MIN/G
STRESS FLOW - MIN: 0.57 CC/MIN/G
STRESS FLOW - SEPTAL: 0.83 CC/MIN/G
STRESS FLOW - WHOLE HEART: 0.9 CC/MIN/G
SYSTOLIC BLOOD PRESSURE: 177 MMHG
TDI LATERAL: 0.1 M/S
TDI SEPTAL: 0.08 M/S
TDI: 0.09 M/S
TR MAX PG: 49 MMHG
TRICUSPID ANNULAR PLANE SYSTOLIC EXCURSION: 1.52 CM
TV REST PULMONARY ARTERY PRESSURE: 52 MMHG
Z-SCORE OF LEFT VENTRICULAR DIMENSION IN END DIASTOLE: -5.58
Z-SCORE OF LEFT VENTRICULAR DIMENSION IN END SYSTOLE: -3.71

## 2024-04-17 PROCEDURE — 93016 CV STRESS TEST SUPVJ ONLY: CPT | Mod: TXP,,, | Performed by: INTERNAL MEDICINE

## 2024-04-17 PROCEDURE — 63600175 PHARM REV CODE 636 W HCPCS: Mod: TXP | Performed by: NURSE PRACTITIONER

## 2024-04-17 PROCEDURE — 78434 AQMBF PET REST & RX STRESS: CPT | Mod: 26,TXP,, | Performed by: INTERNAL MEDICINE

## 2024-04-17 PROCEDURE — 93306 TTE W/DOPPLER COMPLETE: CPT | Mod: TXP

## 2024-04-17 PROCEDURE — 93306 TTE W/DOPPLER COMPLETE: CPT | Mod: 26,TXP,, | Performed by: INTERNAL MEDICINE

## 2024-04-17 PROCEDURE — 78431 MYOCRD IMG PET RST&STRS CT: CPT | Mod: 26,TXP,, | Performed by: INTERNAL MEDICINE

## 2024-04-17 PROCEDURE — 74176 CT ABD & PELVIS W/O CONTRAST: CPT | Mod: 26,TXP,, | Performed by: RADIOLOGY

## 2024-04-17 PROCEDURE — 78434 AQMBF PET REST & RX STRESS: CPT | Mod: TXP

## 2024-04-17 PROCEDURE — A9555 RB82 RUBIDIUM: HCPCS | Mod: TXP | Performed by: NURSE PRACTITIONER

## 2024-04-17 PROCEDURE — 74176 CT ABD & PELVIS W/O CONTRAST: CPT | Mod: TC,TXP

## 2024-04-17 PROCEDURE — 93018 CV STRESS TEST I&R ONLY: CPT | Mod: TXP,,, | Performed by: INTERNAL MEDICINE

## 2024-04-17 RX ORDER — AMINOPHYLLINE 25 MG/ML
75 INJECTION, SOLUTION INTRAVENOUS
Status: COMPLETED | OUTPATIENT
Start: 2024-04-17 | End: 2024-04-17

## 2024-04-17 RX ORDER — REGADENOSON 0.08 MG/ML
0.4 INJECTION, SOLUTION INTRAVENOUS
Status: COMPLETED | OUTPATIENT
Start: 2024-04-17 | End: 2024-04-17

## 2024-04-17 RX ADMIN — REGADENOSON 0.4 MG: 0.08 INJECTION, SOLUTION INTRAVENOUS at 08:04

## 2024-04-17 RX ADMIN — RUBIDIUM CHLORIDE RB-82 33.9 MILLICURIE: 150 INJECTION, SOLUTION INTRAVENOUS at 08:04

## 2024-04-17 RX ADMIN — AMINOPHYLLINE 75 MG: 25 INJECTION, SOLUTION INTRAVENOUS at 08:04

## 2024-04-22 ENCOUNTER — TELEPHONE (OUTPATIENT)
Dept: TRANSPLANT | Facility: CLINIC | Age: 64
End: 2024-04-22
Payer: COMMERCIAL

## 2024-04-22 DIAGNOSIS — Z76.82 ORGAN TRANSPLANT CANDIDATE: Primary | ICD-10-CM

## 2024-04-22 NOTE — TELEPHONE ENCOUNTER
Called patient to inform him that he needs to see GI re: nodule in pancreas and also CT chest re: pulmonary nodules. No answer, left message.    Cardiac testing faxed over to patient's cardiologist Dr. Zackary Wang at [phone]. Patient's echo showed elevated pa pressure & mild pulmonary hypertension.

## 2024-05-03 ENCOUNTER — TELEPHONE (OUTPATIENT)
Dept: TRANSPLANT | Facility: CLINIC | Age: 64
End: 2024-05-03
Payer: COMMERCIAL

## 2024-05-14 ENCOUNTER — TELEPHONE (OUTPATIENT)
Dept: TRANSPLANT | Facility: CLINIC | Age: 64
End: 2024-05-14
Payer: COMMERCIAL

## 2024-05-14 ENCOUNTER — EPISODE CHANGES (OUTPATIENT)
Dept: TRANSPLANT | Facility: CLINIC | Age: 64
End: 2024-05-14

## 2024-05-14 NOTE — TELEPHONE ENCOUNTER
Attempted to contact pt regarding CT and GI appts there were cancelled. Left detailed message, reminder sent.

## 2024-05-15 ENCOUNTER — TELEPHONE (OUTPATIENT)
Dept: TRANSPLANT | Facility: CLINIC | Age: 64
End: 2024-05-15
Payer: COMMERCIAL

## 2024-05-15 NOTE — TELEPHONE ENCOUNTER
Spoke to pts wife regarding sang'd test. She stated Tuesdays are the worst days to be sang'd and requested not to be sang'd then. Daniela'd GI and CT. Confirmed date, time and location. Reminder mailed.

## 2024-06-08 ENCOUNTER — TELEPHONE (OUTPATIENT)
Dept: ENDOSCOPY | Facility: HOSPITAL | Age: 64
End: 2024-06-08
Payer: COMMERCIAL

## 2024-06-08 NOTE — TELEPHONE ENCOUNTER
Telephone patient to scheduled  eus  with no answer. Direct contact given to call back to schedule procedure.

## 2024-07-01 ENCOUNTER — TELEPHONE (OUTPATIENT)
Dept: ENDOSCOPY | Facility: HOSPITAL | Age: 64
End: 2024-07-01
Payer: COMMERCIAL

## 2024-07-01 NOTE — TELEPHONE ENCOUNTER
Called pt to schedule EUS with no answer. Second attempt made to reach pt with no return call.  Letter sent to address on file. Direct phone number left on voicemail for future scheduling. Order cancelled at this time. Message forward to referring NP

## 2024-07-12 ENCOUNTER — TELEPHONE (OUTPATIENT)
Dept: ENDOSCOPY | Facility: HOSPITAL | Age: 64
End: 2024-07-12
Payer: COMMERCIAL

## 2024-07-12 ENCOUNTER — PATIENT MESSAGE (OUTPATIENT)
Dept: ENDOSCOPY | Facility: HOSPITAL | Age: 64
End: 2024-07-12
Payer: COMMERCIAL

## 2024-07-12 DIAGNOSIS — K86.2 PANCREAS CYST: Primary | ICD-10-CM

## 2024-07-12 NOTE — TELEPHONE ENCOUNTER
Spoke to patient wife Peyton to schedule procedure(s) Upper Endoscopy Ultrasound (EUS)       Physician to perform procedure(s) Dr. TRACY Booth  Date of Procedure (s) 7/25/2024  Arrival Time 11:30 AM  Time of Procedure(s) 12:30 PM   Location of Procedure(s) [address]  Type of Rx Prep sent to patient: Other  Instructions provided to patient via [facility]    Patient was informed on the following information and verbalized understanding. Screening questionnaire reviewed with patient and complete. If procedure requires anesthesia, a responsible adult needs to be present to accompany the patient home, patient cannot drive after receiving anesthesia. Appointment details are tentative, especially check-in time. Patient will receive a prep-op call 7 days prior to confirm check-in time for procedure. If applicable the patient should contact their pharmacy to verify Rx for procedure prep is ready for pick-up. Patient was advised to call the scheduling department at [phone] if pharmacy states no Rx is available. Patient was advised to call the endoscopy scheduling department if any questions or concerns arise.      [facility] Scheduling Department

## 2024-07-18 ENCOUNTER — TELEPHONE (OUTPATIENT)
Dept: GASTROENTEROLOGY | Facility: CLINIC | Age: 64
End: 2024-07-18
Payer: COMMERCIAL

## 2024-07-18 NOTE — TELEPHONE ENCOUNTER
Attempted to contact patient to do a pre-call/confirmation call for EUS scheduled on 7/25. Left message for patient to return my call.

## 2024-07-18 NOTE — TELEPHONE ENCOUNTER
----- Message from Vivian Brice sent at 7/18/2024  9:28 AM CDT -----  Regarding: Clinicals  Good Morning,    I'm requesting supporting clinicals for Mr. Rojas upcoming 07/25/2024 - O/P ULTRASOUND, UPPER GI TRACT, ENDOSCOPIC Pr Endoscopic Ultrasound Exam [[id number]] . I'm unable to pull clinicals and need clinicals fax to [phone] or email to [email or web address] , once I receive clinicals. I will submit to insurance company for medical approval.    Thanks,    Vivian DEAN

## 2024-07-23 ENCOUNTER — TELEPHONE (OUTPATIENT)
Dept: GASTROENTEROLOGY | Facility: CLINIC | Age: 64
End: 2024-07-23
Payer: COMMERCIAL

## 2024-07-24 ENCOUNTER — TELEPHONE (OUTPATIENT)
Dept: ENDOSCOPY | Facility: HOSPITAL | Age: 64
End: 2024-07-24
Payer: COMMERCIAL

## 2024-07-24 NOTE — TELEPHONE ENCOUNTER
Returned patients call today in regards to his Eliquis. Patient's last dose of Eliquis was 7/23 at 0800. Patient was concerned he had to reschedule. I explained he should withhold today and tomorrow until after his procedure. Procedure time was verified; patient verbalized understanding.   
clear

## 2024-07-25 ENCOUNTER — ANESTHESIA EVENT (OUTPATIENT)
Dept: ENDOSCOPY | Facility: HOSPITAL | Age: 64
End: 2024-07-25
Payer: COMMERCIAL

## 2024-07-25 ENCOUNTER — HOSPITAL ENCOUNTER (OUTPATIENT)
Facility: HOSPITAL | Age: 64
Discharge: HOME | End: 2024-07-25
Attending: INTERNAL MEDICINE | Admitting: INTERNAL MEDICINE
Payer: COMMERCIAL

## 2024-07-25 ENCOUNTER — HOSPITAL ENCOUNTER (OUTPATIENT)
Dept: RADIOLOGY | Facility: HOSPITAL | Age: 64
Discharge: HOME | End: 2024-07-25
Attending: NURSE PRACTITIONER
Payer: COMMERCIAL

## 2024-07-25 ENCOUNTER — ANESTHESIA (OUTPATIENT)
Dept: ENDOSCOPY | Facility: HOSPITAL | Age: 64
End: 2024-07-25
Payer: COMMERCIAL

## 2024-07-25 VITALS
HEIGHT: 72 IN | OXYGEN SATURATION: 97 % | WEIGHT: 251 LBS | DIASTOLIC BLOOD PRESSURE: 70 MMHG | HEART RATE: 44 BPM | SYSTOLIC BLOOD PRESSURE: 141 MMHG | BODY MASS INDEX: 34 KG/M2 | TEMPERATURE: 98 F | RESPIRATION RATE: 28 BRPM

## 2024-07-25 DIAGNOSIS — Z76.82 ORGAN TRANSPLANT CANDIDATE: ICD-10-CM

## 2024-07-25 DIAGNOSIS — R93.5 ABNORMAL CT OF THE ABDOMEN: ICD-10-CM

## 2024-07-25 DIAGNOSIS — R93.89 ABNORMAL CT SCAN: Primary | ICD-10-CM

## 2024-07-25 LAB
POCT GLUCOSE: 100 MG/DL (ref 70–110)
POCT GLUCOSE: 109 MG/DL (ref 70–110)

## 2024-07-25 PROCEDURE — 37000008 HC ANESTHESIA 1ST 15 MINUTES: Mod: TXP | Performed by: INTERNAL MEDICINE

## 2024-07-25 PROCEDURE — 82962 GLUCOSE BLOOD TEST: CPT | Mod: NTX | Performed by: INTERNAL MEDICINE

## 2024-07-25 PROCEDURE — 63600175 PHARM REV CODE 636 W HCPCS: Mod: TXP | Performed by: NURSE ANESTHETIST, CERTIFIED REGISTERED

## 2024-07-25 PROCEDURE — 82962 GLUCOSE BLOOD TEST: CPT | Mod: TXP | Performed by: INTERNAL MEDICINE

## 2024-07-25 PROCEDURE — 71250 CT THORAX DX C-: CPT | Mod: TC,TXP

## 2024-07-25 PROCEDURE — 43259 EGD US EXAM DUODENUM/JEJUNUM: CPT | Mod: TXP,,, | Performed by: INTERNAL MEDICINE

## 2024-07-25 PROCEDURE — 43259 EGD US EXAM DUODENUM/JEJUNUM: CPT | Mod: TXP | Performed by: INTERNAL MEDICINE

## 2024-07-25 PROCEDURE — 25000003 PHARM REV CODE 250: Mod: TXP | Performed by: NURSE ANESTHETIST, CERTIFIED REGISTERED

## 2024-07-25 PROCEDURE — 37000009 HC ANESTHESIA EA ADD 15 MINS: Mod: TXP | Performed by: INTERNAL MEDICINE

## 2024-07-25 RX ORDER — PROPOFOL 10 MG/ML
VIAL (ML) INTRAVENOUS CONTINUOUS PRN
Status: DISCONTINUED | OUTPATIENT
Start: 2024-07-25 | End: 2024-07-25

## 2024-07-25 RX ORDER — SODIUM CHLORIDE 0.9 % (FLUSH) 0.9 %
3 SYRINGE (ML) INJECTION
OUTPATIENT
Start: 2024-07-25

## 2024-07-25 RX ORDER — PROPOFOL 10 MG/ML
VIAL (ML) INTRAVENOUS
Status: DISCONTINUED | OUTPATIENT
Start: 2024-07-25 | End: 2024-07-25

## 2024-07-25 RX ORDER — ATROPINE SULFATE 0.1 MG/ML
INJECTION INTRAVENOUS
Status: DISCONTINUED | OUTPATIENT
Start: 2024-07-25 | End: 2024-07-25

## 2024-07-25 RX ORDER — LIDOCAINE HYDROCHLORIDE 20 MG/ML
INJECTION INTRAVENOUS
Status: DISCONTINUED | OUTPATIENT
Start: 2024-07-25 | End: 2024-07-25

## 2024-07-25 RX ORDER — EPINEPHRINE 0.1 MG/ML
INJECTION INTRAVENOUS
Status: DISCONTINUED | OUTPATIENT
Start: 2024-07-25 | End: 2024-07-25

## 2024-07-25 RX ORDER — SODIUM CHLORIDE 9 MG/ML
INJECTION, SOLUTION INTRAVENOUS CONTINUOUS
Status: DISCONTINUED | OUTPATIENT
Start: 2024-07-25 | End: 2024-07-25 | Stop reason: HOSPADM

## 2024-07-25 RX ORDER — ATROPINE SULFATE 0.4 MG/ML
INJECTION, SOLUTION ENDOTRACHEAL; INTRAMEDULLARY; INTRAMUSCULAR; INTRAVENOUS; SUBCUTANEOUS
Status: DISCONTINUED | OUTPATIENT
Start: 2024-07-25 | End: 2024-07-25

## 2024-07-25 RX ORDER — GLUCAGON 1 MG
1 KIT INJECTION
OUTPATIENT
Start: 2024-07-25

## 2024-07-25 RX ORDER — CIPROFLOXACIN 500 MG/1
500 TABLET ORAL 2 TIMES DAILY
Qty: 10 TABLET | Refills: 0 | Status: SHIPPED | OUTPATIENT
Start: 2024-07-25 | End: 2024-07-30

## 2024-07-25 RX ORDER — SODIUM CHLORIDE 0.9 % (FLUSH) 0.9 %
10 SYRINGE (ML) INJECTION
Status: DISCONTINUED | OUTPATIENT
Start: 2024-07-25 | End: 2024-07-25 | Stop reason: HOSPADM

## 2024-07-25 RX ADMIN — LIDOCAINE HYDROCHLORIDE 80 MG: 20 INJECTION INTRAVENOUS at 12:07

## 2024-07-25 RX ADMIN — GLYCOPYRROLATE 0.2 MG: 0.2 INJECTION, SOLUTION INTRAMUSCULAR; INTRAVENOUS at 12:07

## 2024-07-25 RX ADMIN — ATROPINE SULFATE 0.5 MG: 0.1 INJECTION INTRAVENOUS at 12:07

## 2024-07-25 RX ADMIN — EPINEPHRINE 10 MCG: 0.1 INJECTION INTRAVENOUS at 12:07

## 2024-07-25 RX ADMIN — PROPOFOL 50 MG: 10 INJECTION, EMULSION INTRAVENOUS at 12:07

## 2024-07-25 RX ADMIN — PROPOFOL 200 MCG/KG/MIN: 10 INJECTION, EMULSION INTRAVENOUS at 12:07

## 2024-07-25 RX ADMIN — SODIUM CHLORIDE: 9 INJECTION, SOLUTION INTRAVENOUS at 12:07

## 2024-07-25 NOTE — H&P
History & Physical - Short Stay  Gastroenterology      SUBJECTIVE:     Procedure: EUS    Chief Complaint/Indication for Procedure: Abnormal imaging     History of Present Illness:  Patient is a 64 y.o. male presents for evaluation of an abnormal CT scan. He is currently been evaluated for kidney transplant.    Non contrast CT scan on 4/17/2024-    13 mm partially calcified nodule abuts the neck of the pancreas. While this might be a node, pancreatic nodule not excluded.     PTA Medications   Medication Sig    amLODIPine (NORVASC) 5 MG tablet Take 5 mg by mouth once daily.    furosemide (LASIX) 40 MG tablet Take 40 mg by mouth once daily.    HUMALOG KWIKPEN INSULIN 100 unit/mL pen Sliding scale    sertraline (ZOLOFT) 50 MG tablet Take 1 tablet by mouth once daily.    ELIQUIS 5 mg Tab Take 5 mg by mouth 2 (two) times daily.    traZODone (DESYREL) 50 MG tablet Take 1 tablet by mouth every evening.       Review of patient's allergies indicates:  No Known Allergies     Past Medical History:   Diagnosis Date    Anxiety     Atrial fibrillation     Deep vein thrombosis     Diabetes mellitus     Diabetes mellitus, type 2     Disorder of kidney and ureter     Empyema     Hyperlipidemia     Hypertension     Kidney stone     Obesity     Onychomycosis     Paroxysmal atrial fibrillation     Personal history of colonic polyps     Pleural effusion     due to bacterial infection    Pulmonary embolism     Sleep apnea, unspecified     Solitary kidney, acquired     Tinea pedis     Both feet    Type 2 diabetes mellitus with unspecified diabetic retinopathy without macular edema      Past Surgical History:   Procedure Laterality Date    COLON SURGERY      COLONOSCOPY      ESOPHAGOGASTRODUODENOSCOPY      LEG TENDON SURGERY      LUNG DECORTICATION      NEPHRECTOMY Left     1997    UPPER GASTROINTESTINAL ENDOSCOPY       Family History   Problem Relation Name Age of Onset    Hypertension Mother      Heart disease Mother      Diabetes Mother   "    Diabetes Father      Esophageal cancer Father      Cancer Father      Diabetes Maternal Grandmother      Stroke Maternal Grandmother      Diabetes Paternal Grandmother      Cancer Paternal Grandmother      Breast cancer Paternal Grandmother      Heart disease Paternal Grandfather      Diabetes Paternal Grandfather      Cancer Paternal Grandfather      Lung cancer Paternal Grandfather       Social History     Tobacco Use    Smoking status: Never    Smokeless tobacco: Never   Substance Use Topics    Alcohol use: Not Currently    Drug use: Never       Review of Systems:  Constitutional: no fever or chills  Respiratory: no cough or shortness of breath  Cardiovascular: no chest pain or palpitations    OBJECTIVE:     Vital Signs (Most Recent)  Temp: 98 °F (36.7 °C) (07/25/24 1149)  Pulse: (!) 48 (07/25/24 1149)  Resp: 16 (07/25/24 1149)  BP: (!) 180/77 (07/25/24 1149)  SpO2: 100 % (07/25/24 1149)    Physical Exam:  General: well developed, well nourished  Lungs:  normal respiratory effort  Heart: regular rate, S1, S2 normal    Laboratory  CBC: No results for input(s): "WBC", "RBC", "HGB", "HCT", "PLT", "MCV", "MCH", "MCHC" in the last 168 hours.  CMP: No results for input(s): "GLU", "CALCIUM", "ALBUMIN", "PROT", "NA", "K", "CO2", "CL", "BUN", "CREATININE", "ALKPHOS", "ALT", "AST", "BILITOT" in the last 168 hours.  Coagulation: No results for input(s): "LABPROT", "INR", "APTT" in the last 168 hours.    Diagnostic Results:      ASSESSMENT/PLAN:     Abnormal CT scan    Plan: EUS    Anesthesia Plan: MAC    ASA Grade: ASA 3 - Patient with moderate systemic disease with functional limitations    The impression and plan was discussed in detail with the patient and family. All questions have been answered and the patient voices understanding of our plan at this point. The risk of the procedure was discussed in detail which includes but not limited to bleeding, infection, perforation in some cases requiring surgery with its " spectrum of complications.

## 2024-07-25 NOTE — ANESTHESIA PREPROCEDURE EVALUATION
[facility]  Anesthesia Pre-Operative Evaluation     Patient Name: Bhupendra Rojas  YOB: 1960  MRN: [id number]  [facility]: [id number]       Admit Date: 7/25/2024   Admit Team: Networked reference to record PCT   Hospital Day: 1  Date of Procedure: 7/25/2024  Anesthesia: Choice Procedure: Procedure(s) (LRB):  ULTRASOUND, UPPER GI TRACT, ENDOSCOPIC (N/A)  Pre-Operative Diagnosis: Pancreas cyst [K86.2]  Proceduralist:Surgeons and Role:     * Scott Graham MD - Primary  Code Status: No Order   Advanced Directive: <no information>  Isolation Precautions: No active isolations  Capacity: Full capacity     SUBJECTIVE:   Bhupendra Rojas is a 64 y.o. male who  has a past medical history of Anxiety, Atrial fibrillation, Deep vein thrombosis, Diabetes mellitus, Diabetes mellitus, type 2, Disorder of kidney and ureter, Empyema, Hyperlipidemia, Hypertension, Kidney stone, Obesity, Onychomycosis, Paroxysmal atrial fibrillation, Personal history of colonic polyps, Pleural effusion, Pulmonary embolism, Sleep apnea, unspecified, Solitary kidney, acquired, Tinea pedis, and Type 2 diabetes mellitus with unspecified diabetic retinopathy without macular edema.  No notes on file    Hospital LOS: 0 days  ICU LOS: Patient does not have an ICU stay during this admission.    he has a current medication list which includes the following long-term medication(s): amlodipine, furosemide, humalog kwikpen insulin, sertraline, and trazodone.   Current Outpatient Medications   Medication Instructions    amLODIPine (NORVASC) 5 mg, Oral, Daily    ELIQUIS 5 mg, Oral, 2 times daily    furosemide (LASIX) 40 mg, Oral, Daily    HUMALOG KWIKPEN INSULIN 100 unit/mL pen Sliding scale    sertraline (ZOLOFT) 50 MG tablet 1 tablet, Oral, Daily    traZODone (DESYREL) 50 MG tablet 1 tablet, Oral, Nightly     ALLERGIES:   Review of patient's allergies indicates:  No Known Allergies  LDA:   AIRWAY:         [unfilled]      Lines/Drains/Airways       None                  Anesthesia Evaluation      Airway   Mallampati: II  TM distance: Normal  Neck ROM: Normal ROM  Dental    (+) Intact    Pulmonary    (+) sleep apnea  Cardiovascular   Exercise tolerance: poor  (+) hypertension well controlled    Neuro/Psych    (+) neuromuscular disease    GI/Hepatic/Renal    (+) chronic renal disease CKD    Endo/Other    (+) diabetes mellitus type 2 well controlled  Abdominal                  MEDICATIONS:     Current Outpatient Medications on File Prior to Encounter   Medication Sig Dispense Refill Last Dose    amLODIPine (NORVASC) 5 MG tablet Take 5 mg by mouth once daily.   7/24/2024    furosemide (LASIX) 40 MG tablet Take 40 mg by mouth once daily.   7/24/2024    HUMALOG KWIKPEN INSULIN 100 unit/mL pen Sliding scale   7/24/2024    sertraline (ZOLOFT) 50 MG tablet Take 1 tablet by mouth once daily.   7/24/2024    ELIQUIS 5 mg Tab Take 5 mg by mouth 2 (two) times daily.   7/22/2024    traZODone (DESYREL) 50 MG tablet Take 1 tablet by mouth every evening.         Inpatient Medications:  Antibiotics (From admission, onward)      None          VTE Risk Mitigation (From admission, onward)      None              No current facility-administered medications for this encounter.          History:   There are no hospital problems to display for this patient.    Surgical History:    has a past surgical history that includes Nephrectomy (Left); Lung decortication; Leg Tendon Surgery; Upper gastrointestinal endoscopy; Colonoscopy; Colon surgery; and Esophagogastroduodenoscopy.   Social History:    reports that he is not currently sexually active.  reports that he has never smoked. He has never used smokeless tobacco. He reports that he does not currently use alcohol. He reports that he does not use drugs.    There were no vitals filed for this visit.  Vital Signs Range (Last 24H):       There is no height or weight on file to calculate BMI.  Wt Readings  "from Last 4 Encounters:   04/17/24 113.9 kg (251 lb 1.7 oz)   04/17/24 113.9 kg (251 lb)   03/14/24 114.3 kg (251 lb 15.8 oz)   07/25/17 129.3 kg (285 lb)        Intake/Output - Last 3 Shifts       None          Lab Results   Component Value Date    WBC 7.19 03/14/2024    HGB 12.9 (L) 03/14/2024    HCT 41.2 03/14/2024     03/14/2024     03/14/2024    K 4.3 03/14/2024     03/14/2024    CREATININE 3.3 (H) 03/14/2024    BUN 57 (H) 03/14/2024    CO2 24 03/14/2024     (H) 03/14/2024    CALCIUM 9.2 03/14/2024    PHOS 4.4 03/14/2024    ALKPHOS 150 (H) 03/14/2024    ALT 15 03/14/2024    AST 20 03/14/2024    ALBUMIN 3.6 03/14/2024    INR 1.1 03/14/2024    APTT 28.8 03/14/2024    HGBA1C 6.5 (H) 03/14/2024     No results found for this or any previous visit (from the past 12 hour(s)).  No results for input(s): "WBC", "HGB", "HCT", "PLT", "NA", "K", "CREATININE", "GLU", "INR", "LACTATE", "5HIAAPLASMA", "5HIAAURINT", "5HIAA", "4OEOM33PD" in the last 168 hours.  No LMP for male patient.    EKG:   No results found for this or any previous visit.  TTE:  Results for orders placed during the hospital encounter of 04/17/24    Echo    Interpretation Summary  Images from the original result were not included.      Left Ventricle: The left ventricle is normal in size. Normal wall thickness. There is normal systolic function. Ejection fraction by visual approximation is 55%. There is normal diastolic function.    Right Ventricle: Mild right ventricular enlargement. Wall thickness is normal. Systolic function is mildly reduced.    Left Atrium: Left atrium is severely dilated.    Tricuspid Valve: There is mild to moderate regurgitation.    Aorta: Aortic root is mildly dilated measuring 4.5 cm. Ascending aorta is normal measuring 3.5 cm.    Pulmonary Artery: There is mild pulmonary hypertension. The estimated pulmonary artery systolic pressure is 52 mmHg.    IVC/SVC: Normal venous pressure at 3 " mmHg.    HARLEY:  No results found for this or any previous visit.    Stress Test:  No results found for this or any previous visit.    No results found for this or any previous visit.    LHC:  No results found for this or any previous visit.    Cardiac Device Check   No results found for this or any previous visit.    No results found for this or any previous visit.                                                                                                               07/25/2024  Bhupendra Rojas is a 64 y.o., male.      Pre-op Assessment    I have reviewed the Patient Summary Reports.       I have reviewed the Medications.     Review of Systems  Anesthesia Hx:  No problems with previous Anesthesia   History of prior surgery of interest to airway management or planning: nephrectomy, lung surgery. Previous anesthesia: General           Social:  Non-Smoker, No Alcohol Use       Hematology/Oncology:  Hematology Normal   Oncology Normal                                   EENT/Dental:  EENT/Dental Normal           Cardiovascular:  Exercise tolerance: poor   Hypertension, well controlled               PVD                         Pulmonary:        Sleep Apnea H/o PE,DVT               Renal/:  Chronic Renal Disease, CKD   CKD stage IV, Fistula in R forearm, never had dialysis.             Musculoskeletal:  Musculoskeletal Normal                Neurological:    Neuromuscular Disease,       Diabetic polyneuropathy                            Endocrine:  Diabetes, well controlled, type 2         Obesity / BMI > 30  Dermatological:  Skin Normal    Psych:  Psychiatric Normal                  Physical Exam  General: Well nourished, Cooperative, Alert and Oriented    Airway:  Mallampati: II   Mouth Opening: Normal  TM Distance: Normal  Tongue: Normal  Neck ROM: Normal ROM    Dental:  Intact    Anesthesia Plan  Type of Anesthesia, risks & benefits discussed:    Anesthesia Type: Gen Natural Airway  Intra-op Monitoring Plan: Standard  ASA Monitors  Post Op Pain Control Plan: multimodal analgesia and IV/PO Opioids PRN  Induction:  IV  Airway Plan: , Post-Induction  Informed Consent: Informed consent signed with the Patient and all parties understand the risks and agree with anesthesia plan.  All questions answered.   ASA Score: 3    Ready For Surgery From Anesthesia Perspective.     .

## 2024-07-25 NOTE — BRIEF OP NOTE
Discharge Summary/Instructions after an Endoscopic Procedure    Patient Name: Bhupendra Rojas  Patient MRN: [id number]  Patient YOB: 1960    Thursday, July 25, 2024  Scott Graham MD    Dear patient,  As a result of recent federal legislation (The Federal Cures Act), you may receive lab or pathology results from your procedure in your [facility] account before your physician is able to contact you. Your physician or their representative will relay the results to you with their recommendations at their soonest availability.  Thank you,    RESTRICTIONS:  During your procedure today, you received medications for sedation.  These medications may affect your judgment, balance and coordination.  Therefore, for 24 hours, you have the following restrictions:     - DO NOT drive a car, operate machinery, make legal/financial decisions, sign important papers or drink alcohol.      ACTIVITY:  Today: no heavy lifting, straining or running due to procedural sedation/anesthesia.  The following day: return to full activity including work.    DIET:  Eat and drink normally unless instructed otherwise.     TREATMENT FOR COMMON SIDE EFFECTS:  - Mild abdominal pain, nausea, belching, bloating or excessive gas:  rest, eat lightly and use a heating pad.  - Sore Throat: treat with throat lozenges and/or gargle with warm salt water.  - Because air was used during the procedure, expelling large amounts of air from your rectum or belching is normal.  - If a bowel prep was taken, you may not have a bowel movement for 1-3 days.  This is normal.      SYMPTOMS TO WATCH FOR AND REPORT TO YOUR PHYSICIAN:  1. Abdominal pain or bloating, other than gas cramps.  2. Chest pain.  3. Back pain.  4. Signs of infection such as: chills or fever occurring within 24 hours after the procedure.  5. Rectal bleeding, which would show as bright red, maroon, or black stools. (A tablespoon of blood from the rectum is not serious, especially if hemorrhoids are  present.)  6. Vomiting.  7. Weakness or dizziness.      GO DIRECTLY TO THE NEAREST EMERGENCY ROOM IF YOU HAVE ANY OF THE FOLLOWING:     Difficulty breathing              Chills and/or fever over 101 F   Persistent vomiting and/or vomiting blood   Severe abdominal pain   Severe chest pain   Black, tarry stools   Bleeding- more than one tablespoon   Any other symptom or condition that you feel may need urgent attention    Your doctor recommends these additional instructions:  If any biopsies were taken, your doctors clinic will contact you in 1 to 2 weeks with any results.    - Discharge patient to home (ambulatory).   - Resume previous diet.   - Perform CT scan (computed tomography) of the abdomen with contrast in 1 year.    For questions, problems or results please call your physician - Scott Graham MD at Work:  [phone].    [facility], EMERGENCY ROOM PHONE NUMBER: [phone]    IF A COMPLICATION OR EMERGENCY SITUATION ARISES AND YOU ARE UNABLE TO REACH YOUR PHYSICIAN - GO DIRECTLY TO THE EMERGENCY ROOM.

## 2024-07-25 NOTE — PLAN OF CARE
Pt Aox4. VSS. No signs of distress. Patient educated and given discharge instructions at bedside. IV removed. All questions answered. Pt ready for discharge.

## 2024-07-25 NOTE — ANESTHESIA POSTPROCEDURE EVALUATION
Anesthesia Post Evaluation    Patient: Bhupendra Rojas    Procedure(s) Performed: Procedure(s) (LRB):  ULTRASOUND, UPPER GI TRACT, ENDOSCOPIC (N/A)    Final Anesthesia Type: general      Patient location during evaluation: PACU  Patient participation: Yes- Able to Participate  Level of consciousness: awake and alert  Post-procedure vital signs: reviewed and stable  Pain management: adequate  Airway patency: patent    PONV status at discharge: No PONV  Anesthetic complications: no      Cardiovascular status: blood pressure returned to baseline  Respiratory status: unassisted  Hydration status: euvolemic  Follow-up not needed.              Vitals Value Taken Time   /70 07/25/24 1415   Temp 36.6 °C (97.8 °F) 07/25/24 1415   Pulse 44 07/25/24 1415   Resp 28 07/25/24 1415   SpO2 97 % 07/25/24 1415         No case tracking events are documented in the log.      Pain/Mau Score: Mau Score: 10 (7/25/2024  2:15 PM)

## 2024-07-25 NOTE — TRANSFER OF CARE
Anesthesia Transfer of Care Note    Patient: Bhupendra Rojas    Procedure(s) Performed: Procedure(s) (LRB):  ULTRASOUND, UPPER GI TRACT, ENDOSCOPIC (N/A)    Patient location: [facility]    Anesthesia Type: general    Transport from OR: Transported from OR on 2-3 L/min O2 by NC with adequate spontaneous ventilation    Post pain: adequate analgesia    Post assessment: no apparent anesthetic complications and tolerated procedure well    Post vital signs: stable    Level of consciousness: awake    Nausea/Vomiting: no nausea/vomiting    Complications: none    Transfer of care protocol was followed    Last vitals: Visit Vitals  BP (!) 180/77 (Patient Position: Lying)   Pulse (!) 48   Temp 36.7 °C (98 °F) (Temporal)   Resp 16   Ht 6' (1.829 m)   Wt 113.9 kg (251 lb)   SpO2 100%   BMI 34.04 kg/m²

## 2024-07-25 NOTE — PROVATION PATIENT INSTRUCTIONS
Discharge Summary/Instructions after an Endoscopic Procedure  Patient Name: Bhupendra Rojas  Patient MRN: [id number]  Patient YOB: 1960  Thursday, July 25, 2024  Scott Graham MD  Dear patient,  As a result of recent federal legislation (The Federal Cures Act), you may   receive lab or pathology results from your procedure in your [facility]   account before your physician is able to contact you. Your physician or   their representative will relay the results to you with their   recommendations at their soonest availability.  Thank you,  RESTRICTIONS:  During your procedure today, you received medications for sedation.  These   medications may affect your judgment, balance and coordination.  Therefore,   for 24 hours, you have the following restrictions:   - DO NOT drive a car, operate machinery, make legal/financial decisions,   sign important papers or drink alcohol.    ACTIVITY:  Today: no heavy lifting, straining or running due to procedural   sedation/anesthesia.  The following day: return to full activity including work.  DIET:  Eat and drink normally unless instructed otherwise.     TREATMENT FOR COMMON SIDE EFFECTS:  - Mild abdominal pain, nausea, belching, bloating or excessive gas:  rest,   eat lightly and use a heating pad.  - Sore Throat: treat with throat lozenges and/or gargle with warm salt   water.  - Because air was used during the procedure, expelling large amounts of air   from your rectum or belching is normal.  - If a bowel prep was taken, you may not have a bowel movement for 1-3 days.    This is normal.  SYMPTOMS TO WATCH FOR AND REPORT TO YOUR PHYSICIAN:  1. Abdominal pain or bloating, other than gas cramps.  2. Chest pain.  3. Back pain.  4. Signs of infection such as: chills or fever occurring within 24 hours   after the procedure.  5. Rectal bleeding, which would show as bright red, maroon, or black stools.   (A tablespoon of blood from the rectum is not serious, especially if    hemorrhoids are present.)  6. Vomiting.  7. Weakness or dizziness.  GO DIRECTLY TO THE NEAREST EMERGENCY ROOM IF YOU HAVE ANY OF THE FOLLOWING:      Difficulty breathing              Chills and/or fever over 101 F   Persistent vomiting and/or vomiting blood   Severe abdominal pain   Severe chest pain   Black, tarry stools   Bleeding- more than one tablespoon   Any other symptom or condition that you feel may need urgent attention  Your doctor recommends these additional instructions:  If any biopsies were taken, your doctors clinic will contact you in 1 to 2   weeks with any results.  - Discharge patient to home (ambulatory).   - Resume previous diet.   - Perform CT scan (computed tomography) of the abdomen with contrast in 1   year.  For questions, problems or results please call your physician - Scott Graham MD at Work:  [phone].  [facility], EMERGENCY ROOM PHONE NUMBER: [phone]  IF A COMPLICATION OR EMERGENCY SITUATION ARISES AND YOU ARE UNABLE TO REACH   YOUR PHYSICIAN - GO DIRECTLY TO THE EMERGENCY ROOM.  Scott Graham MD  7/25/2024 1:06:26 PM  This report has been verified and signed electronically.  Dear patient,  As a result of recent federal legislation (The Federal Cures Act), you may   receive lab or pathology results from your procedure in your [facility]   account before your physician is able to contact you. Your physician or   their representative will relay the results to you with their   recommendations at their soonest availability.  Thank you,  PROVATION

## 2024-07-29 ENCOUNTER — TELEPHONE (OUTPATIENT)
Dept: TRANSPLANT | Facility: CLINIC | Age: 64
End: 2024-07-29
Payer: COMMERCIAL

## 2024-07-29 DIAGNOSIS — Z76.82 ORGAN TRANSPLANT CANDIDATE: Primary | ICD-10-CM

## 2024-07-29 NOTE — TELEPHONE ENCOUNTER
Called Dr. Wang' s office to inquire about 's. Spoke to Anastacia who said patient never had this done. Order sent. Asked her kindly to schedule by Thursday so we can have by Friday and present patient.

## 2024-07-29 NOTE — TELEPHONE ENCOUNTER
Spoke to pt regarding HLA PRA and Pulmonary that needed to be sang'd. Pt stated Friday would be the best day for test. Confirmed date, time and location of upcoming appt. Reminder mailed.

## 2024-07-29 NOTE — LETTER
This communication is flagged as high priority.      July 29, 2024    Bhupendra Rojas  [address] [facility] [facility] [address]             Dear Mariah Gutierrez, Primary Doctor    Patient: Bhupendra Rojas   MR Number: [id number]   YOB: 1960     A battery of tests must be done to determine if you are in suitable health to undergo a kidney transplant.  All  the recommended studies must be completed and received by the transplant team before you can be presented to the transplant selection committee. Once all your evaluation is complete the committee will then decide if you are a suitable transplant candidate.  The following studies need to be obtained at home:    _X_ ANKLE BRACHIAL INDEX: history of Peripheral Vascular Disease; Clearance for Kidney Transplant. This is needed by Thursday August 1st (patient being discussed on Friday August 2).        You and your doctor should feel free to contact us at any time, if there are questions or concerns about these tests or the transplant evaluation process.    Sincerely,    Jenn Mendoza MD  Medical Director, Kidney & Kidney/Pancreas Transplantation      [facility]  [address] [place] [id number]  [phone] office  [phone] fax

## 2024-08-16 ENCOUNTER — OFFICE VISIT (OUTPATIENT)
Dept: PULMONOLOGY | Facility: CLINIC | Age: 64
End: 2024-08-16
Payer: COMMERCIAL

## 2024-08-16 ENCOUNTER — LAB VISIT (OUTPATIENT)
Dept: LAB | Facility: HOSPITAL | Age: 64
End: 2024-08-16
Attending: NURSE PRACTITIONER
Payer: COMMERCIAL

## 2024-08-16 VITALS
RESPIRATION RATE: 16 BRPM | SYSTOLIC BLOOD PRESSURE: 122 MMHG | WEIGHT: 256.5 LBS | OXYGEN SATURATION: 98 % | HEART RATE: 62 BPM | HEIGHT: 72 IN | DIASTOLIC BLOOD PRESSURE: 60 MMHG | BODY MASS INDEX: 34.74 KG/M2

## 2024-08-16 DIAGNOSIS — Z76.82 ORGAN TRANSPLANT CANDIDATE: ICD-10-CM

## 2024-08-16 DIAGNOSIS — R59.0 MEDIASTINAL LYMPHADENOPATHY: ICD-10-CM

## 2024-08-16 DIAGNOSIS — R91.8 ABNORMAL CT SCAN OF LUNG: ICD-10-CM

## 2024-08-16 DIAGNOSIS — J84.9 ILD (INTERSTITIAL LUNG DISEASE): Primary | ICD-10-CM

## 2024-08-16 DIAGNOSIS — J84.9 ILD (INTERSTITIAL LUNG DISEASE): ICD-10-CM

## 2024-08-16 LAB — RHEUMATOID FACT SERPL-ACNC: <13 IU/ML (ref 0–15)

## 2024-08-16 PROCEDURE — 86606 ASPERGILLUS ANTIBODY: CPT | Mod: TXP | Performed by: INTERNAL MEDICINE

## 2024-08-16 PROCEDURE — 86038 ANTINUCLEAR ANTIBODIES: CPT | Mod: TXP | Performed by: INTERNAL MEDICINE

## 2024-08-16 PROCEDURE — 86612 BLASTOMYCES ANTIBODY: CPT | Mod: TXP | Performed by: INTERNAL MEDICINE

## 2024-08-16 PROCEDURE — 86431 RHEUMATOID FACTOR QUANT: CPT | Mod: TXP | Performed by: INTERNAL MEDICINE

## 2024-08-16 PROCEDURE — 86635 COCCIDIOIDES ANTIBODY: CPT | Mod: TXP | Performed by: INTERNAL MEDICINE

## 2024-08-16 PROCEDURE — 87449 NOS EACH ORGANISM AG IA: CPT | Mod: TXP | Performed by: INTERNAL MEDICINE

## 2024-08-16 PROCEDURE — 99999 PR PBB SHADOW E&M-EST. PATIENT-LVL V: CPT | Mod: PBBFAC,TXP,, | Performed by: INTERNAL MEDICINE

## 2024-08-16 PROCEDURE — 82164 ANGIOTENSIN I ENZYME TEST: CPT | Mod: TXP | Performed by: INTERNAL MEDICINE

## 2024-08-16 PROCEDURE — 83520 IMMUNOASSAY QUANT NOS NONAB: CPT | Mod: TXP | Performed by: INTERNAL MEDICINE

## 2024-08-16 PROCEDURE — 86698 HISTOPLASMA ANTIBODY: CPT | Mod: TXP | Performed by: INTERNAL MEDICINE

## 2024-08-16 PROCEDURE — 86331 IMMUNODIFFUSION OUCHTERLONY: CPT | Mod: 91,TXP | Performed by: INTERNAL MEDICINE

## 2024-08-16 RX ORDER — ROSUVASTATIN CALCIUM 20 MG/1
20 TABLET, COATED ORAL NIGHTLY
COMMUNITY

## 2024-08-16 RX ORDER — EMPAGLIFLOZIN 10 MG/1
10 TABLET, FILM COATED ORAL
COMMUNITY

## 2024-08-16 RX ORDER — ERGOCALCIFEROL 1.25 MG/1
CAPSULE ORAL
COMMUNITY

## 2024-08-16 NOTE — PROGRESS NOTES
Initial Outpatient Pulmonary Evaluation       SUBJECTIVE:     Chief Complaint   Patient presents with    Pulmonary Nodules       History of Present Illness:    Patient is a 64 y.o. male referred for pulmonary evaluation part of his pre kidney transplant assessment.      Never smoker.  History of complicated pleural effusion 2015, history of interstitial abnormalities on CT scan of the chest present in 2019 as well and mediastinal lymphadenopathy.      Denies personal family history of autoimmune disorders.      Does have a cough productive of whitish sputum.      Does have cats and dogs at home.      No personal history of asthma.          Review of Systems   Respiratory:  Positive for cough, sputum production and shortness of breath.        Review of patient's allergies indicates:  No Known Allergies    Current Outpatient Medications   Medication Sig Dispense Refill    amLODIPine (NORVASC) 5 MG tablet Take 5 mg by mouth once daily.      ELIQUIS 5 mg Tab Take 5 mg by mouth 2 (two) times daily.      ergocalciferol (ERGOCALCIFEROL) 50,000 unit Cap Take by mouth.      furosemide (LASIX) 40 MG tablet Take 40 mg by mouth once daily.      HUMALOG KWIKPEN INSULIN 100 unit/mL pen Sliding scale      JARDIANCE 10 mg tablet Take 10 mg by mouth.      rosuvastatin (CRESTOR) 20 MG tablet Take 20 mg by mouth every evening.      sertraline (ZOLOFT) 50 MG tablet Take 1 tablet by mouth once daily.      traZODone (DESYREL) 50 MG tablet Take 1 tablet by mouth every evening.       No current facility-administered medications for this visit.       Past Medical History:   Diagnosis Date    Anxiety     Atrial fibrillation     Deep vein thrombosis     Diabetes mellitus     Diabetes mellitus, type 2     Disorder of kidney and ureter     Empyema     Hyperlipidemia     Hypertension     Kidney stone     Obesity     Onychomycosis     Paroxysmal atrial fibrillation     Personal history of colonic polyps      Pleural effusion     due to bacterial infection    Pulmonary embolism     Sleep apnea, unspecified     Solitary kidney, acquired     Tinea pedis     Both feet    Type 2 diabetes mellitus with unspecified diabetic retinopathy without macular edema      Past Surgical History:   Procedure Laterality Date    COLON SURGERY      COLONOSCOPY      ENDOSCOPIC ULTRASOUND OF UPPER GASTROINTESTINAL TRACT N/A 7/25/2024    Procedure: ULTRASOUND, UPPER GI TRACT, ENDOSCOPIC;  Surgeon: Scott Graham MD;  Location: [address]);  Service: Endoscopy;  Laterality: N/A;  7/12 portal-Eus any MD wherever is convenient for the patient. pierson-left nephrectomy- eliquis approved Dr. Suarez fax [phone] 7/12 MM-tt  7/18PRE-CALL #1-lm  7/23 SWP-    ESOPHAGOGASTRODUODENOSCOPY      LEG TENDON SURGERY      LUNG DECORTICATION      NEPHRECTOMY Left     1997    UPPER GASTROINTESTINAL ENDOSCOPY       Family History   Problem Relation Name Age of Onset    Hypertension Mother      Heart disease Mother      Diabetes Mother      Diabetes Father      Esophageal cancer Father      Cancer Father      Diabetes Maternal Grandmother      Stroke Maternal Grandmother      Diabetes Paternal Grandmother      Cancer Paternal Grandmother      Breast cancer Paternal Grandmother      Heart disease Paternal Grandfather      Diabetes Paternal Grandfather      Cancer Paternal Grandfather      Lung cancer Paternal Grandfather       Social History     Tobacco Use    Smoking status: Never    Smokeless tobacco: Never   Substance Use Topics    Alcohol use: Not Currently    Drug use: Never          OBJECTIVE:     Vital Signs (Most Recent)  Vital Signs  Pulse: 62  Resp: 16  SpO2: 98 %  BP: 122/60  Height and Weight  Height: 6' (182.9 cm)  Weight: 116.3 kg (256 lb 8.1 oz)  BSA (Calculated - sq m): 2.43 sq meters  BMI (Calculated): 34.8  Weight in (lb) to have BMI = 25: 183.9]  Wt Readings from Last 2 Encounters:   08/16/24 116.3 kg (256 lb 8.1 oz)   07/25/24  "113.9 kg (251 lb)         Physical Exam:  Physical Exam   Constitutional: He is oriented to person, place, and time. He appears well-developed and well-nourished.   Cardiovascular: Normal rate and regular rhythm.   Pulmonary/Chest: Normal expansion and effort normal. No respiratory distress. He has no wheezes.   Neurological: He is alert and oriented to person, place, and time.   Psychiatric: His behavior is normal.       Laboratory  Lab Results   Component Value Date    WBC 7.19 03/14/2024    RBC 4.56 (L) 03/14/2024    HGB 12.9 (L) 03/14/2024    HCT 41.2 03/14/2024    MCV 90 03/14/2024    MCH 28.3 03/14/2024    MCHC 31.3 (L) 03/14/2024    RDW 14.2 03/14/2024     03/14/2024    MPV 9.6 03/14/2024    GRAN 4.6 03/14/2024    GRAN 64.6 03/14/2024    LYMPH 1.8 03/14/2024    LYMPH 24.3 03/14/2024    MONO 0.5 03/14/2024    MONO 7.0 03/14/2024    EOS 0.2 03/14/2024    BASO 0.03 03/14/2024    EOSINOPHIL 3.3 03/14/2024    BASOPHIL 0.4 03/14/2024       BMP  Lab Results   Component Value Date     03/14/2024    K 4.3 03/14/2024     03/14/2024    CO2 24 03/14/2024    BUN 57 (H) 03/14/2024    CREATININE 3.3 (H) 03/14/2024    CALCIUM 9.2 03/14/2024    ANIONGAP 9 03/14/2024    ESTGFRAFRICA 20 12/06/2023    AST 20 03/14/2024    ALT 15 03/14/2024    PROT 8.3 03/14/2024       No results found for: "BNP"    Lab Results   Component Value Date    TSH 1.19 11/13/2023       Lab Results   Component Value Date    SEDRATE 35 (H) 11/13/2023       Lab Results   Component Value Date    CRP 1.4 (H) 11/13/2023       No results found for: "IGE"    No results found for: "ASPERGILLUS"  No results found for: "AFUMIGATUSCL"     No results found for: "ACE"    Diagnostic Results:  I have personally reviewed today the following studies :      Narrative & Impression  EXAMINATION:  CT CHEST WITHOUT CONTRAST     CLINICAL HISTORY:  "nodular opacities right lung;"     COMPARISON:  PA and lateral views of the chest dated 03/14/2024.   "   TECHNIQUE:  Volumetric data acquisition of the chest from the lung apices to the adrenals was obtained without intravenous contrast. Sagittal and coronal multiplanar reconstructions were performed. Lack of IV contrast material limits the assessment of mediastinal and abdominal structures.     FINDINGS:  Lungs and large airways: Cluster of tiny nodularities in the left apical region.  The minimal ground-glass opacity in the right upper lobe associated with interlobular septal thickening.  Multiple tiny nodularities in the subpleural areas of the right middle and lower lobes and in the left lower lobe associated with minimal subpleural consolidation in the right lung.  Multiple tiny subpleural calcifications/ossifications bilateral lower lungs.  No evidence of pulmonary mass.  No evidence of consolidation.  Trachea and main bronchi are patent     Pleura: No evidence of pneumothorax or pleural effusion.  Thickening of the right pleura.     Heart and pericardium: Cardiac silhouette is mildly enlarged with no evidence of pericardial effusion.     Mediastinum and gwen: Prominent lymph node measuring 14 mm in station 4R.  Other subcentimeter lymph nodes in mediastinum     Chest wall and lower neck: Thyroid gland is normal in size.  No evidence of lymphadenopathy in the axillary regions.     Vessels: Left-sided aortic arch.  Aorta is normal in caliber.  The mild aortic atheromatous calcifications.  The moderate atheromatous calcifications in the coronary arteries.  Prominent main pulmonary artery measuring 38 mm in transverse diameter.     Bones: Degenerative changes in the spine.  No acute bony abnormality is noted     Upper abdomen: Elevation of the right hemidiaphragm.  The liver, spleen and pancreas are normal in size with no evidence of focal lesion.  Multiple gallstones with no evidence of acute cholecystitis.  Right adrenal gland is normal in size.  The clips about the left adrenal gland.  The visualized right  kidney is normal in size with no evidence of hydronephrosis.  Left kidney is not visualized in this study.  For more details please refer to the CT of the abdomen and pelvis dated 2024.     Impression:     1. Cluster of tiny nodularities in the left apical region, minimal ground-glass opacities and interlobular septal thickening in the right upper lobe and multiple tiny nodularities in the subpleural area of the bilateral lower lungs, right greater than.  Findings may represent infectious/inflammatory process.  2. Tiny subpleural calcifications/ossifications in the bilateral lower lungs may represent infectious process.  These calcifications are also seen in cases of a chronic aspiration.  3. Lymphadenopathy measuring 14 mm in station 4R.  4. Prominent main pulmonary artery measuring 38 mm in transverse diameter may represent pulmonary arterial hypertension.  Clinical correlation is needed.        ASSESSMENT/PLAN:     ILD (interstitial lung disease)  -     Complete PFT with bronchodilator; Future  -     Fraction of  Nitric Oxide; Future  -     NICOLE; Future; Expected date: 2024  -     Rheumatoid Factor; Future; Expected date: 2024  -     Angiotensin Converting Enzyme; Future; Expected date: 2024  -     INTERLEUKIN-2 RECEPTOR; Future; Expected date: 2024  -     Anish Panel (5 Bird Antigens), Serum; Future; Expected date: 2024  -     Fungitell Assay For (1.3)-B-D-Glucans; Future; Expected date: 2024  -     FUNGAL IMMUNODIFFUSION - BLOOD; Future; Expected date: 2024  -     Stress test, pulmonary; Future  -     AFB Culture & Smear; Future; Expected date: 2024  -     Culture, Respiratory with Gram Stain; Future; Expected date: 2024  -     Fungus culture; Future; Expected date: 2024    Organ transplant candidate  -     Ambulatory referral/consult to Pulmonology  -     Complete PFT with bronchodilator; Future  -     Fraction of  Nitric Oxide;  Future  -     NICOLE; Future; Expected date: 2024  -     Rheumatoid Factor; Future; Expected date: 2024  -     Angiotensin Converting Enzyme; Future; Expected date: 2024  -     INTERLEUKIN-2 RECEPTOR; Future; Expected date: 2024  -     Anish Panel (5 Bird Antigens), Serum; Future; Expected date: 2024  -     Fungitell Assay For (1.3)-B-D-Glucans; Future; Expected date: 2024  -     FUNGAL IMMUNODIFFUSION - BLOOD; Future; Expected date: 2024  -     Stress test, pulmonary; Future  -     AFB Culture & Smear; Future; Expected date: 2024  -     Culture, Respiratory with Gram Stain; Future; Expected date: 2024  -     Fungus culture; Future; Expected date: 2024    Mediastinal lymphadenopathy  -     Complete PFT with bronchodilator; Future  -     Fraction of  Nitric Oxide; Future  -     NICOLE; Future; Expected date: 2024  -     Rheumatoid Factor; Future; Expected date: 2024  -     Angiotensin Converting Enzyme; Future; Expected date: 2024  -     INTERLEUKIN-2 RECEPTOR; Future; Expected date: 2024  -     Anish Panel (5 Bird Antigens), Serum; Future; Expected date: 2024  -     Fungitell Assay For (1.3)-B-D-Glucans; Future; Expected date: 2024  -     FUNGAL IMMUNODIFFUSION - BLOOD; Future; Expected date: 2024  -     Stress test, pulmonary; Future  -     AFB Culture & Smear; Future; Expected date: 2024  -     Culture, Respiratory with Gram Stain; Future; Expected date: 2024  -     Fungus culture; Future; Expected date: 2024    Abnormal CT scan of lung  -     Complete PFT with bronchodilator; Future  -     Fraction of  Nitric Oxide; Future  -     NICOLE; Future; Expected date: 2024  -     Rheumatoid Factor; Future; Expected date: 2024  -     Angiotensin Converting Enzyme; Future; Expected date: 2024  -     INTERLEUKIN-2 RECEPTOR; Future; Expected date: 2024  -     Anish Panel (5 Bird  Antigens), Serum; Future; Expected date: 08/16/2024  -     Fungitell Assay For (1.3)-B-D-Glucans; Future; Expected date: 08/16/2024  -     FUNGAL IMMUNODIFFUSION - BLOOD; Future; Expected date: 08/16/2024  -     Stress test, pulmonary; Future  -     AFB Culture & Smear; Future; Expected date: 08/16/2024  -     Culture, Respiratory with Gram Stain; Future; Expected date: 08/16/2024  -     Fungus culture; Future; Expected date: 08/16/2024      CT scan abnormalities can be related to infectious versus inflammatory/vasculitic/autoimmune disorders.      Since he is on anticoagulation, will defer bronchoscopy for now and will send sputum microbiology in addition to fungal serologies and basic autoimmune/vasculitic workup.      Check PFT 6 minute walking test.      Plan for bronchoscopy with EBUS TBNA biopsy and transbronchial biopsy to rule out sarcoidosis or chronic pulmonary atypical infections if above workup is inconclusive.    Follow up in about 1 month (around 9/16/2024).    This note was prepared using voice recognition system and is likely to have sound alike errors that may have been overlooked even after proof reading.  Please call me with any questions    Discussed diagnosis, its evaluation, treatment and usual course. All questions answered.    Thank you for the courtesy of participating in the care of this patient    Selwyn Alvarez MD

## 2024-08-17 LAB — ACE SERPL-CCNC: 47 U/L (ref 16–85)

## 2024-08-18 LAB
1,3 BETA GLUCAN SER-MCNC: <31 PG/ML
FUNGITELL COMMENTS: NEGATIVE

## 2024-08-19 ENCOUNTER — LAB VISIT (OUTPATIENT)
Dept: LAB | Facility: HOSPITAL | Age: 64
End: 2024-08-19
Attending: INTERNAL MEDICINE
Payer: COMMERCIAL

## 2024-08-19 DIAGNOSIS — R59.0 MEDIASTINAL LYMPHADENOPATHY: ICD-10-CM

## 2024-08-19 DIAGNOSIS — R91.8 ABNORMAL CT SCAN OF LUNG: ICD-10-CM

## 2024-08-19 DIAGNOSIS — J84.9 ILD (INTERSTITIAL LUNG DISEASE): ICD-10-CM

## 2024-08-19 DIAGNOSIS — Z76.82 ORGAN TRANSPLANT CANDIDATE: ICD-10-CM

## 2024-08-19 LAB
ANA SER QL IF: NORMAL
SOL IL2 RECEP SERPL-MCNC: 677.3 PG/ML (ref 175.3–858.2)

## 2024-08-19 PROCEDURE — 87070 CULTURE OTHR SPECIMN AEROBIC: CPT | Mod: TXP | Performed by: INTERNAL MEDICINE

## 2024-08-19 PROCEDURE — 87015 SPECIMEN INFECT AGNT CONCNTJ: CPT | Mod: TXP | Performed by: INTERNAL MEDICINE

## 2024-08-19 PROCEDURE — 87205 SMEAR GRAM STAIN: CPT | Mod: TXP | Performed by: INTERNAL MEDICINE

## 2024-08-19 PROCEDURE — 87206 SMEAR FLUORESCENT/ACID STAI: CPT | Mod: TXP | Performed by: INTERNAL MEDICINE

## 2024-08-19 PROCEDURE — 87102 FUNGUS ISOLATION CULTURE: CPT | Mod: TXP | Performed by: INTERNAL MEDICINE

## 2024-08-19 PROCEDURE — 87116 MYCOBACTERIA CULTURE: CPT | Mod: TXP | Performed by: INTERNAL MEDICINE

## 2024-08-21 LAB
ACID FAST MOD KINY STN SPEC: NORMAL
ASPERGILLUS AB SER QL ID: NOT DETECTED
B DERMAT AB SER QL ID: NOT DETECTED
C IMMITIS AB SER QL ID: NOT DETECTED
H CAPSUL AB SER QL ID: NOT DETECTED
MYCOBACTERIUM SPEC QL CULT: NORMAL

## 2024-08-22 LAB
BACTERIA SPEC AEROBE CULT: NORMAL
BACTERIA SPEC AEROBE CULT: NORMAL
GRAM STN SPEC: NORMAL

## 2024-08-23 ENCOUNTER — TELEPHONE (OUTPATIENT)
Dept: TRANSPLANT | Facility: CLINIC | Age: 64
End: 2024-08-23
Payer: COMMERCIAL

## 2024-08-23 NOTE — TELEPHONE ENCOUNTER
Talked to patient about need to complete all pulmonary testing + follow up with pulmonologist before bringing him to committee. Patient voiced understanding and appreciative of call.    ----- Message from Florin Young sent at 8/23/2024 10:31 AM CDT -----  Regarding: Consult/Advisory  Contact: [phone]  Consult/Advisory     Name Of Caller: Pt         Contact Preference:   [phone]     Nature of call: Pt calling requesting updates and to touch bases up on process. Please call to advise

## 2024-09-16 ENCOUNTER — OFFICE VISIT (OUTPATIENT)
Dept: PULMONOLOGY | Facility: CLINIC | Age: 64
End: 2024-09-16
Payer: COMMERCIAL

## 2024-09-16 ENCOUNTER — CLINICAL SUPPORT (OUTPATIENT)
Dept: PULMONOLOGY | Facility: CLINIC | Age: 64
End: 2024-09-16
Payer: COMMERCIAL

## 2024-09-16 VITALS
OXYGEN SATURATION: 97 % | RESPIRATION RATE: 14 BRPM | SYSTOLIC BLOOD PRESSURE: 137 MMHG | BODY MASS INDEX: 34.25 KG/M2 | HEIGHT: 72 IN | DIASTOLIC BLOOD PRESSURE: 62 MMHG | HEART RATE: 49 BPM | WEIGHT: 252.88 LBS

## 2024-09-16 VITALS — HEIGHT: 72 IN | BODY MASS INDEX: 34.24 KG/M2 | WEIGHT: 252.75 LBS

## 2024-09-16 DIAGNOSIS — R59.0 MEDIASTINAL LYMPHADENOPATHY: Primary | ICD-10-CM

## 2024-09-16 DIAGNOSIS — R59.0 MEDIASTINAL LYMPHADENOPATHY: ICD-10-CM

## 2024-09-16 DIAGNOSIS — R05.8 PRODUCTIVE COUGH: ICD-10-CM

## 2024-09-16 DIAGNOSIS — Z76.82 ORGAN TRANSPLANT CANDIDATE: ICD-10-CM

## 2024-09-16 DIAGNOSIS — J84.9 ILD (INTERSTITIAL LUNG DISEASE): ICD-10-CM

## 2024-09-16 DIAGNOSIS — R91.8 ABNORMAL CT SCAN OF LUNG: ICD-10-CM

## 2024-09-16 LAB
BRPFT: ABNORMAL
DLCO ADJ PRE: 12.23 ML/(MIN*MMHG) (ref 23.56–37.41)
DLCO SINGLE BREATH LLN: 23.56
DLCO SINGLE BREATH PRE REF: 40.1 %
DLCO SINGLE BREATH REF: 30.48
DLCOC SBVA LLN: 2.93
DLCOC SBVA PRE REF: 57.8 %
DLCOC SBVA REF: 4.04
DLCOC SINGLE BREATH LLN: 23.56
DLCOC SINGLE BREATH PRE REF: 40.1 %
DLCOC SINGLE BREATH REF: 30.48
DLCOVA LLN: 2.93
DLCOVA PRE REF: 57.8 %
DLCOVA PRE: 2.33 ML/(MIN*MMHG*L) (ref 2.93–5.15)
DLCOVA REF: 4.04
DLVAADJ PRE: 2.33 ML/(MIN*MMHG*L) (ref 2.93–5.15)
ERV LLN: -16448.78
ERV PRE REF: 137.3 %
ERV REF: 1.22
FEF 25 75 CHG: 33.8 %
FEF 25 75 LLN: 1.87
FEF 25 75 POST REF: 56.2 %
FEF 25 75 PRE REF: 42 %
FEF 25 75 REF: 3.58
FET100 CHG: -6.4 %
FEV1 CHG: 9.4 %
FEV1 FVC CHG: 4.8 %
FEV1 FVC LLN: 64
FEV1 FVC POST REF: 93 %
FEV1 FVC PRE REF: 88.7 %
FEV1 FVC REF: 77
FEV1 LLN: 2.78
FEV1 POST REF: 83.3 %
FEV1 PRE REF: 76.1 %
FEV1 REF: 3.73
FRCPLETH LLN: 2.76
FRCPLETH PREREF: 125.7 %
FRCPLETH REF: 3.75
FVC CHG: 4.4 %
FVC LLN: 3.69
FVC POST REF: 89.2 %
FVC PRE REF: 85.5 %
FVC REF: 4.87
IVC PRE: 3.98 L (ref 3.69–6.05)
IVC SINGLE BREATH LLN: 3.69
IVC SINGLE BREATH PRE REF: 81.6 %
IVC SINGLE BREATH REF: 4.87
MVV LLN: 122
MVV PRE REF: 72.6 %
MVV REF: 143
PEF CHG: -4.1 %
PEF LLN: 7.09
PEF POST REF: 92.2 %
PEF PRE REF: 96.2 %
PEF REF: 9.54
POST FEF 25 75: 2.01 L/S (ref 1.87–5.29)
POST FET 100: 11.07 SEC
POST FEV1 FVC: 71.37 % (ref 64.47–89.02)
POST FEV1: 3.1 L (ref 2.78–4.67)
POST FVC: 4.35 L (ref 3.69–6.05)
POST PEF: 8.8 L/S (ref 7.09–11.99)
PRE DLCO: 12.23 ML/(MIN*MMHG) (ref 23.56–37.41)
PRE ERV: 1.67 L (ref -16448.78–16451.22)
PRE FEF 25 75: 1.5 L/S (ref 1.87–5.29)
PRE FET 100: 11.83 SEC
PRE FEV1 FVC: 68.11 % (ref 64.47–89.02)
PRE FEV1: 2.84 L (ref 2.78–4.67)
PRE FRC PL: 4.71 L
PRE FVC: 4.16 L (ref 3.69–6.05)
PRE MVV: 104 L/MIN (ref 121.75–164.73)
PRE PEF: 9.18 L/S (ref 7.09–11.99)
PRE RV: 2.86 L (ref 1.86–3.21)
PRE TLC: 7.02 L (ref 6.39–8.69)
RAW LLN: 3.06
RAW PRE REF: 85.2 %
RAW PRE: 2.61 CMH2O*S/L (ref 3.06–3.06)
RAW REF: 3.06
RV LLN: 1.86
RV PRE REF: 113 %
RV REF: 2.53
RVTLC LLN: 29
RVTLC PRE REF: 106.8 %
RVTLC PRE: 40.72 % (ref 29.16–47.12)
RVTLC REF: 38
TLC LLN: 6.39
TLC PRE REF: 93.1 %
TLC REF: 7.54
VA PRE: 5.24 L (ref 7.39–7.39)
VA SINGLE BREATH LLN: 7.39
VA SINGLE BREATH PRE REF: 70.9 %
VA SINGLE BREATH REF: 7.39
VC LLN: 3.69
VC PRE REF: 85.5 %
VC PRE: 4.16 L (ref 3.69–6.05)
VC REF: 4.87
VTGRAWPRE: 4.5 L

## 2024-09-16 PROCEDURE — 99999 PR PBB SHADOW E&M-EST. PATIENT-LVL I: CPT | Mod: PBBFAC,TXP,,

## 2024-09-16 PROCEDURE — 99999 PR PBB SHADOW E&M-EST. PATIENT-LVL III: CPT | Mod: PBBFAC,TXP,, | Performed by: INTERNAL MEDICINE

## 2024-09-16 RX ORDER — BLOOD-GLUCOSE SENSOR
EACH MISCELLANEOUS
COMMUNITY
Start: 2024-08-26

## 2024-09-16 NOTE — PROCEDURES
[facility] - Pulmonary Function  Six Minute Walk     SUMMARY     Ordering Provider: Dr Alvarez   Interpreting Provider: Dr Alvarez  Performing nurse/tech/RT: AILEEN RRT  Diagnosis: Interstitial Lung Disease  Height: 6' (182.9 cm)  Weight: 114.7 kg (252 lb 12.1 oz)  BMI (Calculated): 34.3                Phase Oxygen Assessment Supplemental O2 Heart   Rate Blood Pressure Nida Dyspnea Scale Rating   Resting 97 % Room Air 49 bpm 137/62 1   Exercise        Minute        1 97 % Room Air 66 bpm     2 96 % Room Air 69 bpm     3 95 % Room Air 73 bpm     4 95 % Room Air 74 bpm     5 94 % Room Air 107 bpm     6  94 % Room Air 106 bpm 169/72 2   Recovery        Minute        1 97 % Room Air 56 bpm     2 99 % Room Air 47 bpm     3 99 % Room Air 45 bpm     4 99 % Room Air 44 bpm 165/70 1     Six Minute Walk Summary  6MWT Status: completed without stopping  Patient Reported: Dyspnea     Interpretation:  Did the patient stop or pause?: No                                         Total Time Walked (Calculated): 360 seconds  Final Partial Lap Distance (feet): 0 feet  Total Distance Meters (Calculated): 365.76 meters  Predicted Distance Meters (Calculated): 562.53 meters  Percentage of Predicted (Calculated): 65.02  Peak VO2 (Calculated): 14.95  Mets: 4.27  Has The Patient Had a Previous Six Minute Walk Test?: No       Previous 6MWT Results  Has The Patient Had a Previous Six Minute Walk Test?: No

## 2024-09-16 NOTE — PROCEDURES
Clinical Guide to Interpretation or FeNO Levels :    FeNO  (ppb) LOW INTERMEDIATE HIGH   ADULT VALUES < 25 25-50          > 50   Th2-driven Inflammation Unlikely Likely Significant     Patients FeNO level at this visit : __28__ (ppb)    Interpretation of FeNO measurement in adults:  [] FENO is less than 25 ppb implies non eosinophilic airway inflammation or the absence of airway inflammation.    Comment: Low FENO (<25 ppb) in adult asthmatics with persistent symptoms suggests other etiologies for these symptoms and a lower likelihood of benefit from adding or increasing inhaled glucocorticoids.    [x] FENO between 25 and 50 ppb in adults should be interpreted cautiously with reference to the clinical situation (eg, symptomatic, on or off therapy, current smoking).    [] FENO greater than 50 ppb in adults  suggests eosinophilic airway inflammation   Comment: High FENO (>50 ppb) in adult asthmatics even with atypical symptoms suggests glucocorticoid responsiveness. High FENO (>50 ppb) can help identify poor adherence or uncontrolled inflammation in asthma patients with otherwise seemingly controlled asthma.    Discussion:  A FENO less than 25 ppb in adults and less than 20 ppb in children younger than 12 years of age implies noneosinophilic airway inflammation or the absence of airway inflammation.  A FENO greater than 50 ppb in adults or greater than 35 ppb in children suggests eosinophilic airway inflammation.   Values of FENO between 25 and 50 ppb in adults (20 to 35 ppb in children) should be interpreted cautiously with reference to the clinical situation (eg, symptomatic, on or off therapy, current smoking).  A rising FENO with a greater than 20 percent change and more than 25 ppb (20 ppb in children) from a previously stable level suggests increasing eosinophilic airway inflammation, but there are wide inter-individual differences.  A decrease in FENO greater than 20 percent for values over 50 ppb or more than  10 ppb for values less than 50 ppb may be clinically important.  ?FENO in other respiratory diseases - Several other diseases are associated with altered levels of exhaled NO: low levels of FENO have been noted in cystic fibrosis, current smoking, pulmonary hypertension, hypothermia, primary ciliary dyskinesia, and bronchopulmonary dysplasia. Elevated FENO has been noted in atopy, nonasthmatic eosinophilic bronchitis, COPD exacerbations, noncystic fibrosis bronchiectasis, and viral upper respiratory infections.    REFERENCE:  ATS Board of Directors, December 2004, and by the ERS Executive Committee, June 2004. ATS/ERS Recommendations for Standardized Procedures for the Online and Offline Measurement of Exhaled Lower Respiratory Nitric Oxide and Nasal Nitric Oxide. Guideline 2005

## 2024-09-16 NOTE — PROGRESS NOTES
Pulmonary Outpatient Follow Up Visit     Subjective:       Patient ID: Bhupendra Rojas is a 64 y.o. male.    Chief Complaint: Interstitial Lung Disease      HPI        Patient is a 64 y.o. male presenting for 2 months follow-up.      09/16/2024 overall stable.  Not on dialysis yet.  CKD stage 4-5.  Status post AV fistula.  He is a kidney transplant possible candidate.      Complains of cough productive of whitish sputum.  Microbiology so far unremarkable but not finalized.      He was initially  referred for pulmonary evaluation part of his pre kidney transplant assessment July 2027.      Never smoker.  History of complicated pleural effusion 2015, history of interstitial abnormalities on CT scan of the chest present in 2019 as well and mediastinal lymphadenopathy.      Denies personal family history of autoimmune disorders.      Does have a cough productive of whitish sputum.      Does have cats and dogs at home.      No personal history of asthma.      Review of Systems   Respiratory:  Positive for cough, sputum production and shortness of breath.        Outpatient Encounter Medications as of 9/16/2024   Medication Sig Dispense Refill    Xochitl (So-Shee) [employer] SAVANNAH 3 SENSOR Breann Use to check sugar 4 times a day      amLODIPine (NORVASC) 5 MG tablet Take 5 mg by mouth once daily.      ELIQUIS 5 mg Tab Take 5 mg by mouth 2 (two) times daily.      ergocalciferol (ERGOCALCIFEROL) 50,000 unit Cap Take by mouth.      furosemide (LASIX) 40 MG tablet Take 40 mg by mouth once daily.      HUMALOG KWIKPEN INSULIN 100 unit/mL pen Sliding scale      JARDIANCE 10 mg tablet Take 10 mg by mouth.      rosuvastatin (CRESTOR) 20 MG tablet Take 20 mg by mouth every evening.      sertraline (ZOLOFT) 50 MG tablet Take 1 tablet by mouth once daily.      traZODone (DESYREL) 50 MG tablet Take 1 tablet by mouth every evening.       No facility-administered encounter medications on file as of 9/16/2024.        Objective:     Vital Signs (Most Recent)  Vital Signs  Pulse: (!) 49  Resp: 14  SpO2: 97 %  BP: 137/62  Height and Weight  Height: 6' (182.9 cm)  Weight: 114.7 kg (252 lb 13.9 oz)  BSA (Calculated - sq m): 2.41 sq meters  BMI (Calculated): 34.3  Weight in (lb) to have BMI = 25: 183.9]  Wt Readings from Last 2 Encounters:   09/16/24 114.7 kg (252 lb 13.9 oz)   09/16/24 114.7 kg (252 lb 12.1 oz)       Physical Exam   Constitutional: He is oriented to person, place, and time. He appears well-developed and well-nourished.   Cardiovascular: Normal rate and regular rhythm.   Pulmonary/Chest: Normal expansion and effort normal. No respiratory distress. He has no wheezes.   Neurological: He is alert and oriented to person, place, and time.   Psychiatric: His behavior is normal.       Laboratory  Lab Results   Component Value Date    WBC 7.19 03/14/2024    RBC 4.56 (L) 03/14/2024    HGB 12.9 (L) 03/14/2024    HCT 41.2 03/14/2024    MCV 90 03/14/2024    MCH 28.3 03/14/2024    MCHC 31.3 (L) 03/14/2024    RDW 14.2 03/14/2024     03/14/2024    MPV 9.6 03/14/2024    GRAN 4.6 03/14/2024    GRAN 64.6 03/14/2024    LYMPH 1.8 03/14/2024    LYMPH 24.3 03/14/2024    MONO 0.5 03/14/2024    MONO 7.0 03/14/2024    EOS 0.2 03/14/2024    BASO 0.03 03/14/2024    EOSINOPHIL 3.3 03/14/2024    BASOPHIL 0.4 03/14/2024       BMP  Lab Results   Component Value Date     03/14/2024    K 4.3 03/14/2024     03/14/2024    CO2 24 03/14/2024    BUN 57 (H) 03/14/2024    CREATININE 3.3 (H) 03/14/2024    CALCIUM 9.2 03/14/2024    ANIONGAP 9 03/14/2024    ESTGFRAFRICA 20 12/06/2023    AST 20 03/14/2024    ALT 15 03/14/2024    PROT 8.3 03/14/2024     Echo April 2024      Left Ventricle: The left ventricle is normal in size. Normal wall thickness. There is normal systolic function. Ejection fraction by visual approximation is 55%. There is normal diastolic function.    Right Ventricle: Mild right ventricular enlargement. Wall thickness  "is normal. Systolic function is mildly reduced.    Left Atrium: Left atrium is severely dilated.    Tricuspid Valve: There is mild to moderate regurgitation.    Aorta: Aortic root is mildly dilated measuring 4.5 cm. Ascending aorta is normal measuring 3.5 cm.    Pulmonary Artery: There is mild pulmonary hypertension. The estimated pulmonary artery systolic pressure is 52 mmHg.    IVC/SVC: Normal venous pressure at 3 mmHg.  No results found for: "BNP"    Lab Results   Component Value Date    TSH 0.63 08/21/2024       Lab Results   Component Value Date    SEDRATE 35 (H) 11/13/2023       Lab Results   Component Value Date    CRP 1.4 (H) 11/13/2023     No results found for: "IGE"     Lab Results   Component Value Date    ASPERGILLUS Not Detected 08/16/2024     No results found for: "AFUMIGATUSCL"     Lab Results   Component Value Date    ACE 47 08/16/2024        Diagnostic Results:  I have personally reviewed today the following studies:  CT CHEST WITHOUT CONTRAST     CLINICAL HISTORY:  "nodular opacities right lung;"     COMPARISON:  PA and lateral views of the chest dated 03/14/2024.     TECHNIQUE:  Volumetric data acquisition of the chest from the lung apices to the adrenals was obtained without intravenous contrast. Sagittal and coronal multiplanar reconstructions were performed. Lack of IV contrast material limits the assessment of mediastinal and abdominal structures.     FINDINGS:  Lungs and large airways: Cluster of tiny nodularities in the left apical region.  The minimal ground-glass opacity in the right upper lobe associated with interlobular septal thickening.  Multiple tiny nodularities in the subpleural areas of the right middle and lower lobes and in the left lower lobe associated with minimal subpleural consolidation in the right lung.  Multiple tiny subpleural calcifications/ossifications bilateral lower lungs.  No evidence of pulmonary mass.  No evidence of consolidation.  Trachea and main bronchi are " patent     Pleura: No evidence of pneumothorax or pleural effusion.  Thickening of the right pleura.     Heart and pericardium: Cardiac silhouette is mildly enlarged with no evidence of pericardial effusion.     Mediastinum and gwen: Prominent lymph node measuring 14 mm in station 4R.  Other subcentimeter lymph nodes in mediastinum     Chest wall and lower neck: Thyroid gland is normal in size.  No evidence of lymphadenopathy in the axillary regions.     Vessels: Left-sided aortic arch.  Aorta is normal in caliber.  The mild aortic atheromatous calcifications.  The moderate atheromatous calcifications in the coronary arteries.  Prominent main pulmonary artery measuring 38 mm in transverse diameter.     Bones: Degenerative changes in the spine.  No acute bony abnormality is noted     Upper abdomen: Elevation of the right hemidiaphragm.  The liver, spleen and pancreas are normal in size with no evidence of focal lesion.  Multiple gallstones with no evidence of acute cholecystitis.  Right adrenal gland is normal in size.  The clips about the left adrenal gland.  The visualized right kidney is normal in size with no evidence of hydronephrosis.  Left kidney is not visualized in this study.  For more details please refer to the CT of the abdomen and pelvis dated 04/17/2024.     Impression:     1. Cluster of tiny nodularities in the left apical region, minimal ground-glass opacities and interlobular septal thickening in the right upper lobe and multiple tiny nodularities in the subpleural area of the bilateral lower lungs, right greater than.  Findings may represent infectious/inflammatory process.  2. Tiny subpleural calcifications/ossifications in the bilateral lower lungs may represent infectious process.  These calcifications are also seen in cases of a chronic aspiration.  3. Lymphadenopathy measuring 14 mm in station 4R.  4. Prominent main pulmonary artery measuring 38 mm in transverse diameter may represent  pulmonary arterial hypertension.  Clinical correlation is needed.         Assessment/Plan:   Mediastinal lymphadenopathy  -     Case Request Endoscopy: ENDOBRONCHIAL ULTRASOUND (EBUS)  -     Vital signs; Standing  -     Verify informed consent; Standing  -     Assess per unit routine; Standing  -     Insert peripheral IV if not present; Standing  -     Remove glasses and/or dentures; Standing  -     Undress from the waist up; Standing  -     Transport patient on stretcher with oxygen available; Standing  -     Notify Physician; Standing  -     Pulse Oximetry Q4H; Standing  -     Full code; Standing  -     Place in Outpatient; Standing    Organ transplant candidate  -     Case Request Endoscopy: ENDOBRONCHIAL ULTRASOUND (EBUS)    ILD (interstitial lung disease)  -     Case Request Endoscopy: ENDOBRONCHIAL ULTRASOUND (EBUS)        Awaiting final microbiology from sputum.      We will schedule him for bronchoscopy and BAL possible transbronchial biopsy and EBUS TBNA biopsy of his hilar lymph node.      His autoimmune serologies and vasculitic serologies are unremarkable.      The changes on his CT scan most likely have been present since before 2019 without major changes when reviewing his CT scan report from 2019 at [facility].      But due to his immunosuppression risk post transplant will proceed with bronchoscopy.      Overall he is stable he has a moderately severe reduction of DLCO likely related to pulmonary hypertension with systolic pulmonary artery pressure of 52 mm Hg likely related to presence of fistula and  MAK untreated due to severe claustrophobia.        Overall he is stable and has no contraindication at the moment to be listed for kidney transplant.      Should additional workup via bronchoscopy reveal any remarkable cause of his CT scan abnormalities a a reassessment of his listing candidacy can be pre visited.    Discussed risk and benefit of bronchoscopy and biopsy.      He will hold  Eliquis 48 hours prior to the procedure.  Last day of Eliquis will be September 30.      Will schedule for bronchoscopy on October 3rd.  10:00 a.m..  Follow up in about 3 months (around 12/16/2024).    This note was prepared using voice recognition system and is likely to have sound alike errors that may have been overlooked even after proof reading.  Please call me with any questions    Discussed diagnosis, its evaluation, treatment and usual course. All questions answered.      Selwyn Alvarez MD

## 2024-09-19 ENCOUNTER — TELEPHONE (OUTPATIENT)
Dept: TRANSPLANT | Facility: CLINIC | Age: 64
End: 2024-09-19
Payer: COMMERCIAL

## 2024-09-19 NOTE — TELEPHONE ENCOUNTER
Spoke to Zayra with Dr. Smart's office, will fax over EUS report. 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    ----- Message from Florin Young sent at 9/19/2024 10:09 AM CDT -----  Regarding: Consult/Advisory  Consult/Advisory     Name Of Caller: Zayra with [facility] office in          Contact Preference: Phone # [phone] Fax # [phone]     Nature of call:Zayra is requesting  Eus report and any pathology report.   negative

## 2024-10-01 ENCOUNTER — PATIENT MESSAGE (OUTPATIENT)
Dept: PULMONOLOGY | Facility: CLINIC | Age: 64
End: 2024-10-01
Payer: COMMERCIAL

## 2024-10-01 ENCOUNTER — TELEPHONE (OUTPATIENT)
Dept: PREADMISSION TESTING | Facility: HOSPITAL | Age: 64
End: 2024-10-01
Payer: COMMERCIAL

## 2024-11-19 ENCOUNTER — PATIENT MESSAGE (OUTPATIENT)
Dept: NEUROLOGY | Facility: CLINIC | Age: 64
End: 2024-11-19
Payer: COMMERCIAL

## 2024-11-19 ENCOUNTER — TELEPHONE (OUTPATIENT)
Dept: PULMONOLOGY | Facility: CLINIC | Age: 64
End: 2024-11-19
Payer: COMMERCIAL

## 2024-11-19 NOTE — TELEPHONE ENCOUNTER
----- Message from Selwyn Alvarez MD sent at 11/19/2024  2:57 PM CST -----  Regarding: RE: Appt  Contact: [phone]  Please inform him that I need to see him again before rescheduling him.  It has been almost 2 months since we last met and I would like to reassess him in person before rescheduling the procedure.    Regards,     Selwyn Alvarez M.D  ----- Message -----  From: Judy Lima MA  Sent: 11/19/2024   8:29 AM CST  To: Selwyn Alvarez MD  Subject: FW: Appt                                           ----- Message -----  From: Margi Molina  Sent: 11/19/2024   8:07 AM CST  To: [facility] Staff  Subject: Appt                                             Leigh/wife calling to reschedule surgery on 11/21. Would like to schedule in December if possible. Please call [phone]

## 2024-12-16 ENCOUNTER — OFFICE VISIT (OUTPATIENT)
Dept: PULMONOLOGY | Facility: CLINIC | Age: 64
End: 2024-12-16
Payer: COMMERCIAL

## 2024-12-16 VITALS
DIASTOLIC BLOOD PRESSURE: 75 MMHG | HEIGHT: 73 IN | WEIGHT: 274.69 LBS | SYSTOLIC BLOOD PRESSURE: 125 MMHG | RESPIRATION RATE: 17 BRPM | HEART RATE: 44 BPM | BODY MASS INDEX: 36.41 KG/M2 | OXYGEN SATURATION: 94 %

## 2024-12-16 DIAGNOSIS — J84.9 ILD (INTERSTITIAL LUNG DISEASE): ICD-10-CM

## 2024-12-16 DIAGNOSIS — G47.31 CENTRAL SLEEP APNEA: Primary | ICD-10-CM

## 2024-12-16 DIAGNOSIS — Z76.82 ORGAN TRANSPLANT CANDIDATE: ICD-10-CM

## 2024-12-16 DIAGNOSIS — R59.0 MEDIASTINAL LYMPHADENOPATHY: ICD-10-CM

## 2024-12-16 PROCEDURE — 3074F SYST BP LT 130 MM HG: CPT | Mod: CPTII,S$GLB,TXP, | Performed by: INTERNAL MEDICINE

## 2024-12-16 PROCEDURE — 1159F MED LIST DOCD IN RCRD: CPT | Mod: CPTII,S$GLB,TXP, | Performed by: INTERNAL MEDICINE

## 2024-12-16 PROCEDURE — 3044F HG A1C LEVEL LT 7.0%: CPT | Mod: CPTII,S$GLB,TXP, | Performed by: INTERNAL MEDICINE

## 2024-12-16 PROCEDURE — 1160F RVW MEDS BY RX/DR IN RCRD: CPT | Mod: CPTII,S$GLB,TXP, | Performed by: INTERNAL MEDICINE

## 2024-12-16 PROCEDURE — 3078F DIAST BP <80 MM HG: CPT | Mod: CPTII,S$GLB,TXP, | Performed by: INTERNAL MEDICINE

## 2024-12-16 PROCEDURE — 99214 OFFICE O/P EST MOD 30 MIN: CPT | Mod: S$GLB,TXP,, | Performed by: INTERNAL MEDICINE

## 2024-12-16 PROCEDURE — 99999 PR PBB SHADOW E&M-EST. PATIENT-LVL V: CPT | Mod: PBBFAC,TXP,, | Performed by: INTERNAL MEDICINE

## 2024-12-16 PROCEDURE — 3008F BODY MASS INDEX DOCD: CPT | Mod: CPTII,S$GLB,TXP, | Performed by: INTERNAL MEDICINE

## 2024-12-16 NOTE — PROGRESS NOTES
Pulmonary Outpatient Follow Up Visit     Subjective:       Patient ID: Bhupendra Rojas is a 64 y.o. male.    Chief Complaint: Pulmonary Nodules      HPI        Patient is a 64 y.o. male presenting for  follow-up.      12/16 daily cough with whitish yellowish sputum, SOB mild .  In the process of evaluation for kidney transplant.      Chronic interstitial changes and mediastinal lymphadenopathy reported on multiple CT scans since 2019.    09/16/2024 overall stable.  Not on dialysis yet.  CKD stage 4-5.  Status post AV fistula.  He is a kidney transplant possible candidate.      Complains of cough productive of whitish sputum.  Microbiology so far unremarkable but not finalized.      He was initially  referred for pulmonary evaluation part of his pre kidney transplant assessment July 2027.      Never smoker.  History of complicated pleural effusion 2015, history of interstitial abnormalities on CT scan of the chest present in 2019 as well and mediastinal lymphadenopathy.      Denies personal family history of autoimmune disorders.      Does have a cough productive of whitish sputum.      Does have cats and dogs at home.      No personal history of asthma.      Review of Systems   Respiratory:  Positive for cough, sputum production and shortness of breath.        Outpatient Encounter Medications as of 12/16/2024   Medication Sig Dispense Refill    amLODIPine (NORVASC) 5 MG tablet Take 5 mg by mouth once daily.      ELIQUIS 5 mg Tab Take 5 mg by mouth 2 (two) times daily.      ergocalciferol (ERGOCALCIFEROL) 50,000 unit Cap Take by mouth.      FREESTYLE SAVANNAH 3 SENSOR Breann Use to check sugar 4 times a day      furosemide (LASIX) 40 MG tablet Take 40 mg by mouth once daily.      HUMALOG KWIKPEN INSULIN 100 unit/mL pen Sliding scale      JARDIANCE 10 mg tablet Take 10 mg by mouth.      rosuvastatin (CRESTOR) 20 MG tablet Take 20 mg by mouth every evening.      sertraline (ZOLOFT) 50 MG  "tablet Take 1 tablet by mouth once daily.      traZODone (DESYREL) 50 MG tablet Take 1 tablet by mouth every evening.       No facility-administered encounter medications on file as of 12/16/2024.       Objective:     Vital Signs (Most Recent)  Vital Signs  Pulse: (!) 44  Resp: 17  SpO2: (!) 94 %  BP: 125/75  Patient Position: Sitting  Pain Score:   2  Pain Loc: Foot  Height and Weight  Height: 6' 1" (185.4 cm)  Weight: 124.6 kg (274 lb 11.1 oz)  BSA (Calculated - sq m): 2.53 sq meters  BMI (Calculated): 36.2  Weight in (lb) to have BMI = 25: 189.1]  Wt Readings from Last 2 Encounters:   12/16/24 124.6 kg (274 lb 11.1 oz)   09/16/24 114.7 kg (252 lb 13.9 oz)       Physical Exam   Constitutional: He is oriented to person, place, and time. He appears well-developed and well-nourished.   Cardiovascular: Normal rate and regular rhythm.   Pulmonary/Chest: Normal expansion and effort normal. No respiratory distress. He has no wheezes.   Neurological: He is alert and oriented to person, place, and time.   Psychiatric: His behavior is normal.       Laboratory  Lab Results   Component Value Date    WBC 7.19 03/14/2024    RBC 4.56 (L) 03/14/2024    HGB 12.9 (L) 03/14/2024    HCT 41.2 03/14/2024    MCV 90 03/14/2024    MCH 28.3 03/14/2024    MCHC 31.3 (L) 03/14/2024    RDW 14.2 03/14/2024     03/14/2024    MPV 9.6 03/14/2024    GRAN 4.6 03/14/2024    GRAN 64.6 03/14/2024    LYMPH 1.8 03/14/2024    LYMPH 24.3 03/14/2024    MONO 0.5 03/14/2024    MONO 7.0 03/14/2024    EOS 0.2 03/14/2024    BASO 0.03 03/14/2024    EOSINOPHIL 3.3 03/14/2024    BASOPHIL 0.4 03/14/2024       BMP  Lab Results   Component Value Date     03/14/2024    K 4.3 03/14/2024     03/14/2024    CO2 24 03/14/2024    BUN 57 (H) 03/14/2024    CREATININE 3.3 (H) 03/14/2024    CALCIUM 9.2 03/14/2024    ANIONGAP 9 03/14/2024    ESTGFRAFRICA 20 12/06/2023    AST 20 03/14/2024    ALT 15 03/14/2024    PROT 8.3 03/14/2024     Echo April 2024      Left " "Ventricle: The left ventricle is normal in size. Normal wall thickness. There is normal systolic function. Ejection fraction by visual approximation is 55%. There is normal diastolic function.    Right Ventricle: Mild right ventricular enlargement. Wall thickness is normal. Systolic function is mildly reduced.    Left Atrium: Left atrium is severely dilated.    Tricuspid Valve: There is mild to moderate regurgitation.    Aorta: Aortic root is mildly dilated measuring 4.5 cm. Ascending aorta is normal measuring 3.5 cm.    Pulmonary Artery: There is mild pulmonary hypertension. The estimated pulmonary artery systolic pressure is 52 mmHg.    IVC/SVC: Normal venous pressure at 3 mmHg.  No results found for: "BNP"    Lab Results   Component Value Date    TSH 0.63 08/21/2024       Lab Results   Component Value Date    SEDRATE 35 (H) 11/13/2023       Lab Results   Component Value Date    CRP 1.4 (H) 11/13/2023     No results found for: "IGE"     Lab Results   Component Value Date    ASPERGILLUS Not Detected 08/16/2024     No results found for: "AFUMIGATUSCL"     Lab Results   Component Value Date    ACE 47 08/16/2024        Diagnostic Results:  I have personally reviewed today the following studies:  CT CHEST WITHOUT CONTRAST     CLINICAL HISTORY:  "nodular opacities right lung;"     COMPARISON:  PA and lateral views of the chest dated 03/14/2024.     TECHNIQUE:  Volumetric data acquisition of the chest from the lung apices to the adrenals was obtained without intravenous contrast. Sagittal and coronal multiplanar reconstructions were performed. Lack of IV contrast material limits the assessment of mediastinal and abdominal structures.     FINDINGS:  Lungs and large airways: Cluster of tiny nodularities in the left apical region.  The minimal ground-glass opacity in the right upper lobe associated with interlobular septal thickening.  Multiple tiny nodularities in the subpleural areas of the right middle and lower lobes " and in the left lower lobe associated with minimal subpleural consolidation in the right lung.  Multiple tiny subpleural calcifications/ossifications bilateral lower lungs.  No evidence of pulmonary mass.  No evidence of consolidation.  Trachea and main bronchi are patent     Pleura: No evidence of pneumothorax or pleural effusion.  Thickening of the right pleura.     Heart and pericardium: Cardiac silhouette is mildly enlarged with no evidence of pericardial effusion.     Mediastinum and gwen: Prominent lymph node measuring 14 mm in station 4R.  Other subcentimeter lymph nodes in mediastinum     Chest wall and lower neck: Thyroid gland is normal in size.  No evidence of lymphadenopathy in the axillary regions.     Vessels: Left-sided aortic arch.  Aorta is normal in caliber.  The mild aortic atheromatous calcifications.  The moderate atheromatous calcifications in the coronary arteries.  Prominent main pulmonary artery measuring 38 mm in transverse diameter.     Bones: Degenerative changes in the spine.  No acute bony abnormality is noted     Upper abdomen: Elevation of the right hemidiaphragm.  The liver, spleen and pancreas are normal in size with no evidence of focal lesion.  Multiple gallstones with no evidence of acute cholecystitis.  Right adrenal gland is normal in size.  The clips about the left adrenal gland.  The visualized right kidney is normal in size with no evidence of hydronephrosis.  Left kidney is not visualized in this study.  For more details please refer to the CT of the abdomen and pelvis dated 04/17/2024.     Impression:     1. Cluster of tiny nodularities in the left apical region, minimal ground-glass opacities and interlobular septal thickening in the right upper lobe and multiple tiny nodularities in the subpleural area of the bilateral lower lungs, right greater than.  Findings may represent infectious/inflammatory process.  2. Tiny subpleural calcifications/ossifications in the  bilateral lower lungs may represent infectious process.  These calcifications are also seen in cases of a chronic aspiration.  3. Lymphadenopathy measuring 14 mm in station 4R.  4. Prominent main pulmonary artery measuring 38 mm in transverse diameter may represent pulmonary arterial hypertension.  Clinical correlation is needed.          In lab sleep study PSG, done on 02/14/20, which showed Severe Central predominant SAS (AHI/BRANDIN 73.9 Low SpO2 72 %)   Assessment/Plan:   Central sleep apnea  -     Polysomnogram (CPAP will be added if patient meets diagnostic criteria.); Future    Mediastinal lymphadenopathy  -     Case Request Endoscopy: ENDOBRONCHIAL ULTRASOUND (EBUS)  -     Vital signs; Standing  -     Verify informed consent; Standing  -     Assess per unit routine; Standing  -     Insert peripheral IV if not present; Standing  -     Remove glasses and/or dentures; Standing  -     Undress from the waist up; Standing  -     Transport patient on stretcher with oxygen available; Standing  -     Notify Physician; Standing  -     Pulse Oximetry Q4H; Standing  -     Full code; Standing  -     Place in Outpatient; Standing  -     X-Ray Chest AP Portable; Standing  -     FL Less Than 1 Hour; Standing    ILD (interstitial lung disease)  -     Spirometry with/without bronchodilator & DLCO; Future; Expected date: 03/16/2025  -     Stress test, pulmonary; Future; Expected date: 03/16/2025    Organ transplant candidate  -     Spirometry with/without bronchodilator & DLCO; Future; Expected date: 03/16/2025  -     Stress test, pulmonary; Future; Expected date: 03/16/2025          Sputum microbiology unremarkable.      Will schedule him this week for bronchoscopy BAL transbronchial biopsy and EBUS TBNA biopsy of lymph node.      Hold Eliquis [facility] blood 2 procedure.      His autoimmune serologies and vasculitic serologies are unremarkable.      The changes on his CT scan most likely have been present since before 2019  without major changes when reviewing his CT scan report from 2019 at [facility].      But due to his immunosuppression risk post transplant will proceed with bronchoscopy.      Overall he is stable he has a moderately severe reduction of DLCO likely related to pulmonary hypertension with systolic pulmonary artery pressure of 52 mm Hg likely related to presence of fistula and  MAK untreated due to severe claustrophobia.      Re-evaluate sleep disordered breathing with in-lab polysomnography due to predominance of CSA on his previous in-lab polysomnography in 2020    Discussed risks associated with bronchoscopy and biopsy agreeable to proceed.      Follow up in about 3 months (around 3/16/2025).    This note was prepared using voice recognition system and is likely to have sound alike errors that may have been overlooked even after proof reading.  Please call me with any questions    Discussed diagnosis, its evaluation, treatment and usual course. All questions answered.      Selwyn Alvarez MD

## 2024-12-19 ENCOUNTER — HOSPITAL ENCOUNTER (OUTPATIENT)
Facility: HOSPITAL | Age: 64
Discharge: HOME | End: 2024-12-19
Attending: INTERNAL MEDICINE | Admitting: INTERNAL MEDICINE
Payer: COMMERCIAL

## 2024-12-19 ENCOUNTER — ANESTHESIA (OUTPATIENT)
Dept: ENDOSCOPY | Facility: HOSPITAL | Age: 64
End: 2024-12-19
Payer: COMMERCIAL

## 2024-12-19 ENCOUNTER — ANESTHESIA EVENT (OUTPATIENT)
Dept: ENDOSCOPY | Facility: HOSPITAL | Age: 64
End: 2024-12-19
Payer: COMMERCIAL

## 2024-12-19 DIAGNOSIS — R59.0 MEDIASTINAL LYMPHADENOPATHY: ICD-10-CM

## 2024-12-19 DIAGNOSIS — G47.31 CENTRAL SLEEP APNEA: ICD-10-CM

## 2024-12-19 LAB
APPEARANCE FLD: NORMAL
BODY FLD TYPE: NORMAL
COLOR FLD: NORMAL
GLUCOSE SERPL-MCNC: 124 MG/DL (ref 70–110)
LYMPHOCYTES NFR FLD MANUAL: 18 %
MONOS+MACROS NFR FLD MANUAL: 56 %
NEUTROPHILS NFR FLD MANUAL: 26 %
POCT GLUCOSE: 124 MG/DL (ref 70–110)
WBC # FLD: 97 /CU MM

## 2024-12-19 PROCEDURE — 31624 DX BRONCHOSCOPE/LAVAGE: CPT | Mod: TXP | Performed by: INTERNAL MEDICINE

## 2024-12-19 PROCEDURE — 87116 MYCOBACTERIA CULTURE: CPT | Mod: TXP | Performed by: INTERNAL MEDICINE

## 2024-12-19 PROCEDURE — 88112 CYTOPATH CELL ENHANCE TECH: CPT | Mod: NTX | Performed by: PATHOLOGY

## 2024-12-19 PROCEDURE — 31625 BRONCHOSCOPY W/BIOPSY(S): CPT | Mod: 59,TXP | Performed by: INTERNAL MEDICINE

## 2024-12-19 PROCEDURE — 63600175 PHARM REV CODE 636 W HCPCS: Mod: TXP

## 2024-12-19 PROCEDURE — 25000003 PHARM REV CODE 250: Mod: TXP | Performed by: FAMILY MEDICINE

## 2024-12-19 PROCEDURE — 31652 BRONCH EBUS SAMPLNG 1/2 NODE: CPT | Mod: TXP | Performed by: INTERNAL MEDICINE

## 2024-12-19 PROCEDURE — 31628 BRONCHOSCOPY/LUNG BX EACH: CPT | Mod: TXP | Performed by: INTERNAL MEDICINE

## 2024-12-19 PROCEDURE — 27202059 HC NEEDLE, FNA (ANY): Mod: NTX | Performed by: INTERNAL MEDICINE

## 2024-12-19 PROCEDURE — 88172 CYTP DX EVAL FNA 1ST EA SITE: CPT | Mod: 59,NTX | Performed by: PATHOLOGY

## 2024-12-19 PROCEDURE — 25000003 PHARM REV CODE 250: Mod: TXP

## 2024-12-19 PROCEDURE — 88305 TISSUE EXAM BY PATHOLOGIST: CPT | Mod: TXP | Performed by: PATHOLOGY

## 2024-12-19 PROCEDURE — 88173 CYTOPATH EVAL FNA REPORT: CPT | Mod: TXP | Performed by: PATHOLOGY

## 2024-12-19 PROCEDURE — 37000009 HC ANESTHESIA EA ADD 15 MINS: Mod: TXP | Performed by: INTERNAL MEDICINE

## 2024-12-19 PROCEDURE — 37000008 HC ANESTHESIA 1ST 15 MINUTES: Mod: TXP | Performed by: INTERNAL MEDICINE

## 2024-12-19 PROCEDURE — 87070 CULTURE OTHR SPECIMN AEROBIC: CPT | Mod: TXP | Performed by: INTERNAL MEDICINE

## 2024-12-19 PROCEDURE — 87205 SMEAR GRAM STAIN: CPT | Mod: TXP | Performed by: INTERNAL MEDICINE

## 2024-12-19 PROCEDURE — 87206 SMEAR FLUORESCENT/ACID STAI: CPT | Mod: TXP | Performed by: INTERNAL MEDICINE

## 2024-12-19 PROCEDURE — 89051 BODY FLUID CELL COUNT: CPT | Mod: TXP | Performed by: INTERNAL MEDICINE

## 2024-12-19 PROCEDURE — 88177 CYTP FNA EVAL EA ADDL: CPT | Mod: 59,TXP | Performed by: PATHOLOGY

## 2024-12-19 PROCEDURE — 87015 SPECIMEN INFECT AGNT CONCNTJ: CPT | Mod: TXP | Performed by: INTERNAL MEDICINE

## 2024-12-19 PROCEDURE — 87210 SMEAR WET MOUNT SALINE/INK: CPT | Mod: TXP | Performed by: INTERNAL MEDICINE

## 2024-12-19 PROCEDURE — 27200944 HC BRONCH FORCEPS DISPOSABLE: Mod: TXP | Performed by: INTERNAL MEDICINE

## 2024-12-19 RX ORDER — ROCURONIUM BROMIDE 10 MG/ML
INJECTION, SOLUTION INTRAVENOUS
Status: DISCONTINUED | OUTPATIENT
Start: 2024-12-19 | End: 2024-12-19

## 2024-12-19 RX ORDER — LIDOCAINE HYDROCHLORIDE 20 MG/ML
INJECTION INTRAVENOUS
Status: DISCONTINUED | OUTPATIENT
Start: 2024-12-19 | End: 2024-12-19

## 2024-12-19 RX ORDER — PROPOFOL 10 MG/ML
VIAL (ML) INTRAVENOUS
Status: DISCONTINUED | OUTPATIENT
Start: 2024-12-19 | End: 2024-12-19

## 2024-12-19 RX ADMIN — LIDOCAINE HYDROCHLORIDE 50 MG: 20 INJECTION INTRAVENOUS at 11:12

## 2024-12-19 RX ADMIN — PROPOFOL 150 MG: 10 INJECTION, EMULSION INTRAVENOUS at 11:12

## 2024-12-19 RX ADMIN — SUGAMMADEX 200 MG: 100 INJECTION, SOLUTION INTRAVENOUS at 12:12

## 2024-12-19 RX ADMIN — ROCURONIUM BROMIDE 50 MG: 10 INJECTION, SOLUTION INTRAVENOUS at 11:12

## 2024-12-19 RX ADMIN — GLYCOPYRROLATE 0.2 MG: 0.2 INJECTION, SOLUTION INTRAMUSCULAR; INTRAVITREAL at 11:12

## 2024-12-19 NOTE — DISCHARGE SUMMARY
[facility] ([facility])  Pulmonology  Discharge Summary      Patient Name: Bhupendra Rojas  MRN: [id number]  Admission Date: 12/19/2024  Hospital Length of Stay: 0 days  Discharge Date and Time:  12/19/2024 1:02 PM  Attending Physician: Selwyn Alvarez MD   Discharging Provider: Selwyn Alvarez MD  Primary Care Provider: No, Primary Doctor    HPI:  Abnormal CT scan and mediastinal lymphadenopathy    Procedure(s) (LRB):  ENDOBRONCHIAL ULTRASOUND (EBUS) (Bilateral)    Indwelling Lines/Drains at Time of Discharge:   Lines/Drains/Airways       None                   Hospital Course:  Lavage biopsy of right middle lobe EBUS biopsy of mediastinal and hilar lymph node          Pending Diagnostic Studies:       Procedure Component Value Units Date/Time    Cytology- FNA Radiology Guided, Bronch/EBUS, EUS/GI [[id number]] Collected: 12/19/24 1214    Order Status: Sent Lab Status: In process Updated: 12/19/24 1215    Specimen: Fine Needle Aspirate     WBC & Diff,Body Fluid Other (Specify) (BAL RML) [[id number]] Collected: 12/19/24 1219    Order Status: Sent Lab Status: In process Updated: 12/19/24 1258    Specimen: Other (Specify)           Final Active Diagnoses:    Diagnosis Date Noted POA    Mediastinal lymphadenopathy [R59.0] 12/19/2024 Unknown      Problems Resolved During this Admission:       Discharged Condition: stable    Disposition: Home or Self Care    Follow Up:   Follow-up Information       Selwyn Alvarez MD Follow up in 1 month(s).    Specialty: Pulmonary Disease  Contact information:  [address] [place] [address] [address] Kesha BHAKTA [address] [phone]                           Patient Instructions:   Resume blood thinner Eliquis tomorrow evening    You might experience today a low-grade fever please take acetaminophen 650 mg every 8 hours as needed    You might experience today blood tinged phlegm, this should subside within 24-48 hours     For severe blood or respiratory distress please proceed to the  emergency room    Medications:  Reconciled Home Medications:      Medication List        CONTINUE taking these medications      amLODIPine 5 MG tablet  Commonly known as: NORVASC  Take 5 mg by mouth once daily.     ELIQUIS 5 mg Tab  Generic drug: apixaban  Take 5 mg by mouth 2 (two) times daily.     ergocalciferol 50,000 unit Cap  Commonly known as: ERGOCALCIFEROL  Take by mouth.     FREESTYLE SAVANNAH 3 SENSOR Breann  Generic drug: blood-glucose sensor  Use to check sugar 4 times a day     furosemide 40 MG tablet  Commonly known as: LASIX  Take 40 mg by mouth once daily.     HumaLOG KwikPen Insulin 100 unit/mL pen  Generic drug: insulin lispro  Sliding scale     JARDIANCE 10 mg tablet  Generic drug: empagliflozin  Take 10 mg by mouth.     rosuvastatin 20 MG tablet  Commonly known as: CRESTOR  Take 20 mg by mouth every evening.     sertraline 50 MG tablet  Commonly known as: ZOLOFT  Take 1 tablet by mouth once daily.     traZODone 50 MG tablet  Commonly known as: DESYREL  Take 1 tablet by mouth every evening.              Selwyn Alvarez MD  Pulmonology  [facility] ([facility])

## 2024-12-19 NOTE — BRIEF OP NOTE
Regular bronchoscopy used for inspection.  Granular appearance of the mucosa was noted bilaterally.  Moderate whitish secretions noted bilaterally.  BAL of right middle lobe was performed by instilling 30 mL of normal saline for 3 consecutive times and aspiration back of 20 mL of turbid whitish return with secretions after each time.  Subsequently and under fluoroscopy guidance 3 passes of forceps transbronchial biopsy of right middle lobe were performed and 3 specimen less than 5 mm each were collected in formalin.  Subsequently the right middle lobe drea was visualized and 3 passes of endobronchial forceps biopsy of the right middle lobe drea were performed.  Scope was change to EBUS scope.  Inspection of mediastinal and bilateral hilar lymph nodes showed a conglomerate of subcarinal lymph node largest was 2 cm.   5 passes of EBUS TBNA biopsy with needle size 21 were performed at station 7 subcarinal lymph node station.  Station 11 L revealed a 1 cm left hilar lymph node and 4 passes of EBUS TBNA biopsy of left hilar lymph node with needle size 21 minutes performed.  Good hemostasis was noted.

## 2024-12-19 NOTE — ANESTHESIA PROCEDURE NOTES
Intubation    Date/Time: 12/19/2024 11:13 AM    Performed by: Erik Guy CRNA  Authorized by: Mark Gray II, MD    Intubation:     Induction:  Intravenous    Intubated:  Postinduction    Mask Ventilation:  Easy with oral airway    Attempts:  1    Attempted By:  CRNA    Method of Intubation:  Video laryngoscopy    Blade:  Weber 4    Laryngeal View Grade: Grade I - full view of cords      Difficult Airway Encountered?: No      Complications:  None    Airway Device:  Oral endotracheal tube    Airway Device Size:  8.5    Style/Cuff Inflation:  Cuffed (inflated to minimal occlusive pressure)    Tube secured:  22    Secured at:  The lips    Placement Verified By:  Capnometry    Complicating Factors:  None    Findings Post-Intubation:  BS equal bilateral

## 2024-12-19 NOTE — ANESTHESIA POSTPROCEDURE EVALUATION
Anesthesia Post Evaluation    Patient: Bhupendra Rojas    Procedure(s) Performed: Procedure(s) (LRB):  ENDOBRONCHIAL ULTRASOUND (EBUS) (Bilateral)    Final Anesthesia Type: general      Patient location during evaluation: GI PACU  Patient participation: Yes- Able to Participate  Level of consciousness: awake and alert  Post-procedure vital signs: reviewed and stable  Pain management: adequate  Airway patency: patent    PONV status at discharge: No PONV  Anesthetic complications: no      Cardiovascular status: stable  Respiratory status: unassisted and spontaneous ventilation  Hydration status: euvolemic  Follow-up not needed.              Vitals Value Taken Time   /72 12/19/24 0902   Temp 36.9 °C (98.4 °F) 12/19/24 0902   Pulse 38 12/19/24 0902   Resp 20 12/19/24 0902   SpO2 96 % 12/19/24 0902         No case tracking events are documented in the log.      Pain/Mau Score: No data recorded

## 2024-12-19 NOTE — TRANSFER OF CARE
Anesthesia Transfer of Care Note    Patient: Bhupendra Rojas    Procedure(s) Performed: Procedure(s) (LRB):  ENDOBRONCHIAL ULTRASOUND (EBUS) (Bilateral)    Patient location: GI    Anesthesia Type: general    Transport from OR: Transported from OR on room air with adequate spontaneous ventilation    Post pain: adequate analgesia    Post assessment: no apparent anesthetic complications    Post vital signs: stable    Level of consciousness: awake and responds to stimulation    Nausea/Vomiting: no nausea/vomiting    Complications: none    Transfer of care protocol was followed      Last vitals: Visit Vitals  BP (!) 169/72   Pulse (!) 38   Temp 36.9 °C (98.4 °F)   Resp 20   SpO2 96%

## 2024-12-19 NOTE — ANESTHESIA PREPROCEDURE EVALUATION
12/19/2024  Bhupendra Rojas is a 64 y.o., male.      Pre-op Assessment    I have reviewed the Patient Summary Reports.     I have reviewed the Nursing Notes. I have reviewed the NPO Status.   I have reviewed the Medications.     Review of Systems  Anesthesia Hx:  No problems with previous Anesthesia   History of prior surgery of interest to airway management or planning: nephrectomy, lung surgery. 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             Musculoskeletal:  Musculoskeletal Normal                Neurological:    Neuromuscular Disease,       Diabetic polyneuropathy                            Endocrine:  Diabetes, well controlled, type 2         Obesity / BMI > 30  Dermatological:  Skin Normal    Psych:  Psychiatric Normal                    Physical Exam  General: Well nourished, Cooperative, Alert and Oriented    Airway:  Mallampati: II   Mouth Opening: Normal  TM Distance: Normal  Tongue: Normal  Neck ROM: Normal ROM    Dental:  Intact    Chest/Lungs:  Clear to auscultation    Heart:  Rhythm: Regular Rhythm  Sounds: Normal    Abdomen:  Normal        Anesthesia Plan  Type of Anesthesia, risks & benefits discussed:    Anesthesia Type: Gen ETT  Intra-op Monitoring Plan: Standard ASA Monitors  Induction:  IV  Informed Consent: Informed consent signed with the Patient and all parties understand the risks and agree with anesthesia plan.  All questions answered.   ASA  Score: 3  Day of Surgery Review of History & Physical: I have interviewed and examined the patient. I have reviewed the patient's H&P dated:     Ready For Surgery From Anesthesia Perspective.     .

## 2024-12-19 NOTE — PLAN OF CARE
Dr. Alvarez at bedside. Patient stable. Educated on discharge instructions. Okay to discharge patient.

## 2024-12-19 NOTE — INTERVAL H&P NOTE
The patient has been examined and the H&P has been reviewed:    I concur with the findings and no changes have occurred since H&P was written.    Procedure risks, benefits and alternative options discussed and understood by patient/family.        Bronchoscopy BAL transbronchial biopsy EBUS biopsy

## 2024-12-19 NOTE — LETTER
Bhupendra Crystal  [address] [address]    Hold your Eliquis x 2 full days before procedure.     [facility] ENDOSCOPY PATIENT INSTRUCTIONS  You are scheduled for a/an EBUS (Procedure), on Thursday (Day), 12/19/2024 (Date).       Your arrival time is 08:30 AM.                 Your procedure will be performed at [facility] ([facility]). The hospital is located at [address], off [address], [address] ([facility] [place]). Once on [facility], [facility] is the second building (Entrance #2) on the left. Check in for your procedure on the 1st floor.       UPPER ENDOSCOPY/ERCP/BRONCHOSCOPY PROCEDURES:   You may not have anything to eat or drink after midnight. You make take your morning medications with a small sip of water.    ALL PATIENTS:   Please plan to be at the hospital for 3 - 4 hours.   Use of Anesthesia requires you to have a responsible person to drive you to the hospital, stay while the procedure is being performed, assume responsibility for your care at discharge, and drive you home. You should not operate a vehicle, machinery or sign any legal documents until the next day. YOU MUST HAVE A RESPONSIBLE PERSON TO DRIVE YOU HOME.  Leave all valuables at home, including jewelry. (Piercings must be removed) You will need to bring your 's license, medical insurance card, and a method of payment. You will be responsible for any co-payment at time of registration - Please reach out to Financial Services if you have any questions or concerns.   If biopsies need to be performed or a polyp needs to be removed, this may result in a change in the billing of your procedure and could impact your payment responsibility depending on your insurance provider.   Wear clothing appropriate for easy re-dressing after sedation.   Please bring a complete list of all medications you are taking.     MEDICATIONS:  BLOOD THINNING MEDICATION (Coumadin, Plavix, Eliquis, etc.):  If you  are on a blood thinning medication, our scheduling team will obtain clearance to hold from your prescribing physician. Please do not stop these medications until it is approved by your provider. Our scheduling nurse will notify you of an appropriate date and time to hold prior to your procedure.  Coumadin (WARFARIN), Plavix (CLOPIDOGREL), and Effient (PRASUGREL) MUST be stopped 5 days prior to exam unless discussed with the doctor performing the test.   Eliquis (APIXABAN), Savaysa (EDOXABAN), Arixtra (FONDAPARINUX), and Xarelto (RIVAROXABAN) MUST be stopped 2 days prior to exam unless discussed with the doctor performing the test.  WEIGHT LOSS MEDICATIONS - MUST be stopped 1week prior to your appointment  BLOOD PRESSURE, HEART, SEIZURE, LUNG, or PSYCHIATRIC MEDICATIONS you normally take in the morning, please take them the morning of your procedure. This includes Inhalers.   Please take these medications one hour prior to your arrival time with a small sip of water    DIABETIC PATIENTS:   Do not take any diabetic medications (including insulin) the morning of the exam.   If your blood sugar goes below 70, you may drink 2 ounces of clear juice, soda. Wait 15 minutes, then recheck your blood sugar. If it isn't going up, you may drink another 2 ounces of clear juice and contact the On-Call Nurse line at [phone].     Questions regarding scheduling: Endoscopy Scheduling [phone] (M-F, 7:30am-4:30pm)   Questions about Insurance/ Financial obligations: Financial Services [phone]   Questions requiring immediate assistance: [facility] On-Call Nurse Line [phone] (After Hours)

## 2024-12-20 VITALS
DIASTOLIC BLOOD PRESSURE: 85 MMHG | RESPIRATION RATE: 17 BRPM | TEMPERATURE: 98 F | HEART RATE: 51 BPM | SYSTOLIC BLOOD PRESSURE: 177 MMHG | OXYGEN SATURATION: 98 %

## 2024-12-20 LAB — KOH PREP SPEC: NORMAL

## 2024-12-21 LAB
ACID FAST MOD KINY STN SPEC: NORMAL
MYCOBACTERIUM SPEC QL CULT: NORMAL

## 2024-12-23 LAB
ADEQUACY: NORMAL
BACTERIA SPEC AEROBE CULT: NORMAL
BACTERIA SPEC AEROBE CULT: NORMAL
FINAL PATHOLOGIC DIAGNOSIS: NORMAL
GRAM STN SPEC: NORMAL
GRAM STN SPEC: NORMAL
Lab: NORMAL

## 2025-01-06 ENCOUNTER — TELEPHONE (OUTPATIENT)
Dept: TRANSPLANT | Facility: CLINIC | Age: 65
End: 2025-01-06
Payer: COMMERCIAL

## 2025-01-09 ENCOUNTER — PATIENT MESSAGE (OUTPATIENT)
Dept: TRANSPLANT | Facility: CLINIC | Age: 65
End: 2025-01-09
Payer: COMMERCIAL

## 2025-01-09 ENCOUNTER — TELEPHONE (OUTPATIENT)
Dept: TRANSPLANT | Facility: CLINIC | Age: 65
End: 2025-01-09
Payer: COMMERCIAL

## 2025-01-09 NOTE — TELEPHONE ENCOUNTER
MA notes per Pre Dialysis adherence form     FOR THE PAST THREE MONTHS:    0-Appt Adhrence  0-No show    No concerns with labs, transportation, or mental health    No information on caregiver.     Scanned in pt's media     Angle Suero  Abdominal Transplant MA

## 2025-01-10 ENCOUNTER — PATIENT MESSAGE (OUTPATIENT)
Dept: TRANSPLANT | Facility: CLINIC | Age: 65
End: 2025-01-10
Payer: COMMERCIAL

## 2025-01-10 ENCOUNTER — EPISODE CHANGES (OUTPATIENT)
Dept: TRANSPLANT | Facility: HOSPITAL | Age: 65
End: 2025-01-10

## 2025-01-10 ENCOUNTER — TELEPHONE (OUTPATIENT)
Dept: TRANSPLANT | Facility: CLINIC | Age: 65
End: 2025-01-10
Payer: COMMERCIAL

## 2025-01-10 ENCOUNTER — COMMITTEE REVIEW (OUTPATIENT)
Dept: TRANSPLANT | Facility: CLINIC | Age: 65
End: 2025-01-10
Payer: COMMERCIAL

## 2025-01-10 NOTE — COMMITTEE REVIEW
Native Organ Dx: Diabetes Mellitus - Type II      SELECTION COMMITTEE NOTE    Bhupendra Rojas was presented at selection committee on 1/10/25 . Patient met selection criteria for kidney transplant related to CKD, Stage 4 due to  Diabetes Mellitus - Type II. No absolute contraindications to transplant at this time. Patient will be placed on the cadaveric wait list pending final financial approval from insurance company. Patient will return to clinic for routine appointment in 1 year(s). Patient does not meet criteria for High KDPI kidney offer. Patient meets HCV+ kidney offer. Patient does not meet criteria for dual/enbloc, due to guidelines.    Planned immunosuppression Thymoglobulin.    Called patient to inform him of decision made by The Kidney Selection Committee, spoke to his wife Leigh who voiced understanding. Patient is still pre-dialysis.      Note written by Onur Klein RN.     ===============================================    I was present at the meeting and attest to the decision of the committee.    Ovi Coto  01/10/2025

## 2025-01-10 NOTE — TELEPHONE ENCOUNTER
Patient was presented today 1/10/25 before the Kidney Selection Committee. After review of records, it was decided that patient will be approved but listed inactive until he sees Dr. Scott with podiatry (scheduled 1/15) and there is a clear treatment plan moving forward regarding his foot wound.

## 2025-01-10 NOTE — LETTER
January 10, 2025    Lisset Casey FNP-C  [address] [facility] [address]  Phone: [phone]  Fax: [phone]     Dear Ms. Casey:    Patient: Bhupendra Rojas   MR Number: [id number]   YOB: 1960     Your patient, Bhupendra Rojas, was recently discussed at the [facility] Kidney Selection Committee meeting on 1/10/25 . I am happy to inform you that Bhupendra has been approved for transplantation. He has met selection criteria for a kidney transplant related to Diabetes Mellitus - Type II. Your patient will be placed on the cadaveric wait list pending final financial approval from insurance company.     We appreciate your confidence in allowing us to participate in your patients care.  If you have any questions or concerns, please do not hesitate to contact me.    Sincerely,      Jenn Mendoza MD  Medical Director, Kidney & Kidney/Pancreas Transplantation    CC:  Bhupendra Rojas (patient)

## 2025-01-28 ENCOUNTER — TELEPHONE (OUTPATIENT)
Dept: TRANSPLANT | Facility: CLINIC | Age: 65
End: 2025-01-28
Payer: COMMERCIAL

## 2025-01-28 DIAGNOSIS — Z76.82 ORGAN TRANSPLANT CANDIDATE: Primary | ICD-10-CM

## 2025-01-28 NOTE — LETTER
January 28, 2025    Bhupendra Crystal  [address] [place] [id number]    Dear Bhupendra Rojas:  MRN: [id number]    Congratulations!  This letter is to inform you that you were placed on the kidney transplant waiting list at [facility] on 1/28/2025. You will be listed as Status 7 (inactive status) until your foot wound is healed and there is a  treatment plan moving forward. We will need clearance from your Podiatrist. Your candidacy for kidney transplant is based on the following criteria: Chronic Kidney Disease- Stage IV due to Diabetes mellitus- Type II.      If you have any alternate phone numbers that you would like us to have, please call us with those numbers.  During this time period, you should prepare yourself mentally and make plans for your affairs during the time of your transplant.  You should be ready to leave your home within 15 minutes of receiving the phone call telling you to come in for transplant.     The Center for Medicaid Services and the [employer] for Organ Sharing requires that we inform any patient placed on our waiting list of information that would impact their ability to receive a transplant.  [facility]s kidney transplant program has a transplant surgeon and physician available 365 days a year, 24 hours a day and 7 days a week to facilitate organ acceptance, procurement, and implantation, and to address urgent patient issues.  You will be notified in writing of any changes to our key transplant personnel and of any changes to our staffing plan that would impact your ability to receive a transplant.    Attached is a letter from the [employer] for Organ Sharing ([employer]).  It describes the services and information offered to patients by UN[employer] and the Organ Procurement and Transplant Network.    We would like to inform you of an important OPTN (Organ Procurement and Transplant Network) Policy change that may affect the waiting time for some candidates on our waiting list.     Waiting time  is important in identifying who receives offers for kidneys. A long wait time may increase your chance of getting an offer. Wait time is based on a test called eGFR that tells how well your kidneys are working. Wait time could also be based on how long you have been on dialysis. Government and health officials have changed the way this test is used. Before this year, hospitals used an eGFR that would include your race. For Black or  Americans, this eGFR could have shown that their kidneys were working better than they were.    Because of this change, we are looking at everyones record and assessing waiting time for people who are eligible. We will be reviewing everyones medical records and will contact you if you are eligible.     Who can I talk to if I have a question?  You can contact us if you have questions or send a message through [facility].     Please give us time to answer your questions. We are working on this for many patients.    How can I learn more about eGFR and this policy change?  Go to OPTN website > Patients > Kidney > FAQ: Understanding race-neutral eGFR calculations  Full URL: https://optn.transplant.[facility].gov/patients/by-organ/kidney/understanding-the-proposal-to-require-race-neutral-egfr-calculations/  Call the Organ Procurement and Transplantation Network (OPTN) toll-free patient services line at 1-968.833.8777    Again, we congratulate you on your success and look forward to working with you very closely in the future.  If you have any questions or require any additional information, please do not hesitate to contact the pre-transplant office at [phone].    Sincerely,          Jenn Mendoza MD  Medical Director, Kidney & Kidney/Pancreas Transplantation  jr  Cc: VALERIANO Cortez                   The Organ Procurement and Transplantation Network   Toll-free patient services line: [phone]  Your resource for organ transplant information      Staffed 8:30 am  - 5:00 pm ET Monday - Friday   Leave a message 24/7 to receive a call back    The Organ Procurement and Transplantation Network (OPTN) is the national transplant system. It makes the policies that decide how donated organs are matched to patients waiting for a transplant. The OPTN:    Makes sure donated organs get matched to people on the transplant waiting list  Tells people about the donation and transplant processes  Makes sure that the public knows about the need for more organ and tissue donations    The OPTN has a free patient services line that you can call to:  Get more information about:   o Organ donation and organ transplants   o Donation and transplant policies  Get an information kit with:   o A list of transplant hospitals   o Waiting list information  Talk about any questions you may have about your transplant hospital or organ procurement organization. The staff will do their best to help you or point you to others who may help.  Find out how you can volunteer with the OPTN and help shape transplant policy    The patient services line number is: 4-205-541-2249    Patient services line staff CANNOT answer questions about your own medical care, including:  Waiting list status  Test results  Medical records  You will need to call your transplant hospital for this information.    The following websites have more information about transplantation and donation:  OPTN: https://optn.transplant.hrsa.gov/  For potential living donors and transplant recipients:   o Living with transplant: https://www.transplantliving.org/   o Living donation process: https://optn.transplant.hrsa.gov/living-donation/     o Financial assistance: https://www.livingdonorassistance.org/  Transplantation data: https://www.srtr.org/  Organ donation: https://www.organdonor.gov/    Volunteer with the OPTN: https://optn.transplant.hrsa.gov/get-involved

## 2025-01-28 NOTE — TELEPHONE ENCOUNTER
"KIDNEY WAIT LISTING NOTE    Date of Financial clearance to list: 25    SSN/[facility]:     Organ: Kidney    Last Name: Crystal  First Name: Bhupendra    : 1960       Gender: male        MRN#: [id number]                                   State of Permanent Residence: [address] [place] [id number]    Ethnicity: Not  or /a   Race:      White    CLINICAL INFORMATION   Candidate Medical Urgency Status: Temporarily Inactive (7) Candidate work-up incomplete  Number of Previous Kidney Transplants: 0  Number of Previous Solid Organ Transplants: 0  Did you enter number of previous kidney or other solid organ transplants? Yes  Is this Candidate a Prior Living Donor: No  (If yes, please generate letter to [employer] with patient's date of donation, recipient SSN, signed by Surgical Director after patient is listed in order to receive priority points).      ABO  ABO Blood Group:   O POS     ABO Confirmation: (THESE DATES MUST BE PRIOR TO THE LIST DATE AND SUPPORTED BY SEPARATE LAB REPORTS)    Internal Results    Lab Results   Component Value Date    GROUPTRH O POS 2024    GROUPTRH O POS 2024     No results found for: "ABO"    External Results    ABO Date 1:    ABO Date 2  Are either of these ABO results based on External Labs? No  (If Yes, STOP and go to source document in Media Tab for verification).    VITALS  Height:  5' 11.34"  Weight:  114.3 kg (251 lb 15.8 oz)  (Use height from Transplant clinic visits only).  Did you enter height/weight? Yes    HLA    Class I:  Lab Results   Component Value Date    NLPC3QS 2 2024    WGRK9RA XX 2024    YXLG3DS 7 2024    ZVYR4RW 62 2024    QUDAJ5DS 6 2024    CHHUM0JR XX 2024    MAVSV8KP 9 2024    TCSJY6PX 7 2024       Class II:  Lab Results   Component Value Date    DUXTZP14FM 13 2024    FXAVLW74GC 15 2024    ZSXIEY438OK 52 2024    RKUAVH7729 51 2024    FBTIQ2ED 6 2024    " "CWLYO2ZW XX 03/14/2024       Tested for HLA Antibodies: Yes, no antibodies detected     If result is "Positive" antibodies are detected     If result is "Negative or questionable" no antibodies detected    No results found for: "CIPRAS", "CIIPRAS"    DIALYSIS INFORMATION  Is patient Pre-Dialysis: Yes     GFR Information  Report GFR being used as the criteria for placement on the kidney list. If not, leave blank  GFR < or = 20 ml/min? Yes  If Yes, Specify value 19.3 ml/min     Initial date GFR became 20 or less: 10/10/24  Is GFR obtained from an Outside lab Result? No  (If YES verify with source document scanned into media)    If patient on Dialysis:    Is candidate currently on dialysis for ESRD? No  If Yes,  Date Chronic Dialysis Started:   (Not currently on dialysis)  (verify with source document in Media Tab)   Dialysis Unit Name: (Not currently on dialysis)                        Physician Name:  Dr. Jenn Alston  NPI#: [id number]    DIABETES INFORMATION  Primary Native Kidney Diagnosis: Diabetes Mellitus - Type II  C-Peptide Value - No results found for: "CPEPTIDE"  Current Diabetes Status: Type II diabetes    FOR NON-KIDNEY DEPARTMENT USE ONLY:  Additional Organs Registered? none    Maximum Acceptable Number of HLA Mismatches  ABDR:     6      (0-6)               AB:               (0-4)  ADR:   _____  (0-4)              BDR: _____ (0-4)  A:        _____  (0-2)              B:      _____ (0-2)          DR: ______ (0-2)    Will Recipient Accept?   Accept HBcAB Positive Organ:            Yes  Accept HBV LUC Positive Organ:        No  Accept HCV Antibody Positive Organ: Yes   Accept HCV LUC Positive Organ: Yes    Dual Kidney and En Bloc Opt In : No  Dual  Local:   No  Dual Import:   No  En Bloc Local:   No  En Bloc Import: No     Accept KDPI > 85: Single: No     Local: No     Import: No  Accept KDPI > 85: Dual: No     Local: No     Import: No    ### NURSE TO VERIFY CONSENT AND MAKE ANY NECESSARY CHANGES " NEEDED IN UNET AT THE TIME OF VERIFICATION ###    Unacceptible Antigens  If yes, list     Lab Results   Component Value Date    YN7JVCG Negative 08/16/2024    CIABCLM INCONCLUSIVE 03/14/2024    CIIAB Negative 08/16/2024       ### DO NOT LIST IF ANTIGEN VALUE WEAK ###    eGFR Wait Time Modification    Based on Race White does patient qualify for lab review? No     If found, generate eGFR Wait Time Modification Form and scan into Media.   Send patient letter when wait time is granted.     Hep B Vaccine series completed: unknown    Blood Type x2 was verified by myself and SAFIA Zayas.    Blood type determination and reporting was completed according to the programs protocols and OPTN requirements.

## 2025-01-29 ENCOUNTER — TELEPHONE (OUTPATIENT)
Dept: TRANSPLANT | Facility: CLINIC | Age: 65
End: 2025-01-29
Payer: COMMERCIAL

## 2025-01-29 DIAGNOSIS — Z76.82 PRE-KIDNEY TRANSPLANT, LISTED: Primary | ICD-10-CM

## 2025-01-29 NOTE — TELEPHONE ENCOUNTER
----- Message from Selwyn Alvarez MD sent at 1/3/2025  3:00 PM CST -----  Regarding: RE: Still cleared?  He has so far an unremarkable bronchoscopy BAL and EBUS TBNA biopsy of his lymph nodes.  At this point  I do not see any contraindication to proceed with transplantation once indicated.  ----- Message -----  From: Shruti Klein, SAFIA  Sent: 1/3/2025   1:38 PM CST  To: Selwyn Alvarez MD; Antonio Samano Staff  Subject: Still cleared?                                   [facility], [place] Alvarez,     As you may already know, this patient has been in evaluation for a kidney transplant. We initially received clearance from you before his bronchoscopy with bx of mediastinal lymph node. Is he still cleared from your standpoint to proceed with organ transplant? What are your recommendations going forward?     Please, let us know at your earliest convenience.     Thank you very much,     Onur

## 2025-01-30 ENCOUNTER — TELEPHONE (OUTPATIENT)
Dept: TRANSPLANT | Facility: CLINIC | Age: 65
End: 2025-01-30
Payer: COMMERCIAL

## 2025-02-17 ENCOUNTER — PATIENT MESSAGE (OUTPATIENT)
Dept: PULMONOLOGY | Facility: CLINIC | Age: 65
End: 2025-02-17
Payer: COMMERCIAL

## 2025-03-21 ENCOUNTER — OFFICE VISIT (OUTPATIENT)
Dept: PULMONOLOGY | Facility: CLINIC | Age: 65
End: 2025-03-21
Payer: COMMERCIAL

## 2025-03-21 ENCOUNTER — CLINICAL SUPPORT (OUTPATIENT)
Dept: PULMONOLOGY | Facility: CLINIC | Age: 65
End: 2025-03-21
Payer: COMMERCIAL

## 2025-03-21 VITALS
BODY MASS INDEX: 33.9 KG/M2 | HEIGHT: 73 IN | HEART RATE: 50 BPM | OXYGEN SATURATION: 100 % | HEIGHT: 73 IN | WEIGHT: 255.75 LBS | DIASTOLIC BLOOD PRESSURE: 68 MMHG | SYSTOLIC BLOOD PRESSURE: 150 MMHG | WEIGHT: 255.75 LBS | BODY MASS INDEX: 33.9 KG/M2 | RESPIRATION RATE: 18 BRPM

## 2025-03-21 DIAGNOSIS — J84.9 ILD (INTERSTITIAL LUNG DISEASE): Primary | ICD-10-CM

## 2025-03-21 DIAGNOSIS — J84.9 ILD (INTERSTITIAL LUNG DISEASE): ICD-10-CM

## 2025-03-21 DIAGNOSIS — R59.0 MEDIASTINAL LYMPHADENOPATHY: ICD-10-CM

## 2025-03-21 DIAGNOSIS — Z76.82 ORGAN TRANSPLANT CANDIDATE: ICD-10-CM

## 2025-03-21 DIAGNOSIS — G47.31 CENTRAL SLEEP APNEA: ICD-10-CM

## 2025-03-21 LAB
BRPFT: ABNORMAL
DLCO ADJ PRE: 15.02 ML/(MIN*MMHG) (ref 24.02–37.88)
DLCO SINGLE BREATH LLN: 24.02
DLCO SINGLE BREATH PRE REF: 48.5 %
DLCO SINGLE BREATH REF: 30.95
DLCOC SBVA LLN: 2.93
DLCOC SBVA PRE REF: 68.9 %
DLCOC SBVA REF: 4.02
DLCOC SINGLE BREATH LLN: 24.02
DLCOC SINGLE BREATH PRE REF: 48.5 %
DLCOC SINGLE BREATH REF: 30.95
DLCOVA LLN: 2.93
DLCOVA PRE REF: 68.9 %
DLCOVA PRE: 2.77 ML/(MIN*MMHG*L) (ref 2.93–5.1)
DLCOVA REF: 4.02
DLVAADJ PRE: 2.77 ML/(MIN*MMHG*L) (ref 2.93–5.1)
FEF 25 75 CHG: 52.8 %
FEF 25 75 LLN: 1.87
FEF 25 75 POST REF: 61.7 %
FEF 25 75 PRE REF: 40.4 %
FEF 25 75 REF: 3.58
FET100 CHG: -21.9 %
FEV1 CHG: 10.9 %
FEV1 FVC CHG: 7.6 %
FEV1 FVC LLN: 64
FEV1 FVC POST REF: 97.3 %
FEV1 FVC PRE REF: 90.4 %
FEV1 FVC REF: 77
FEV1 LLN: 2.83
FEV1 POST REF: 79.3 %
FEV1 PRE REF: 71.5 %
FEV1 REF: 3.8
FVC CHG: 3.1 %
FVC LLN: 3.77
FVC POST REF: 81.2 %
FVC PRE REF: 78.8 %
FVC REF: 4.98
IVC PRE: 3.53 L (ref 3.77–6.19)
IVC SINGLE BREATH LLN: 3.77
IVC SINGLE BREATH PRE REF: 70.8 %
IVC SINGLE BREATH REF: 4.98
PEF CHG: 14 %
PEF LLN: 7.14
PEF POST REF: 102.6 %
PEF PRE REF: 90 %
PEF REF: 9.64
POST FEF 25 75: 2.21 L/S (ref 1.87–5.29)
POST FET 100: 6.91 SEC
POST FEV1 FVC: 74.44 % (ref 64.21–88.86)
POST FEV1: 3.01 L (ref 2.83–4.76)
POST FVC: 4.04 L (ref 3.77–6.19)
POST PEF: 9.89 L/S (ref 7.14–12.15)
PRE DLCO: 15.02 ML/(MIN*MMHG) (ref 24.02–37.88)
PRE FEF 25 75: 1.45 L/S (ref 1.87–5.29)
PRE FET 100: 8.85 SEC
PRE FEV1 FVC: 69.2 % (ref 64.21–88.86)
PRE FEV1: 2.71 L (ref 2.83–4.76)
PRE FVC: 3.92 L (ref 3.77–6.19)
PRE PEF: 8.68 L/S (ref 7.14–12.15)
VA PRE: 5.43 L (ref 7.55–7.55)
VA SINGLE BREATH LLN: 7.55
VA SINGLE BREATH PRE REF: 72 %
VA SINGLE BREATH REF: 7.55

## 2025-03-21 PROCEDURE — 99999 PR PBB SHADOW E&M-EST. PATIENT-LVL IV: CPT | Mod: PBBFAC,TXP,, | Performed by: INTERNAL MEDICINE

## 2025-03-21 PROCEDURE — 99999 PR PBB SHADOW E&M-EST. PATIENT-LVL I: CPT | Mod: PBBFAC,TXP,,

## 2025-03-21 NOTE — PROGRESS NOTES
Pulmonary Outpatient Follow Up Visit     Subjective:       Patient ID: Bhupendra Rojas is a 65 y.o. male.    Chief Complaint: Pulmonary Nodules      HPI        Patient is a 65 y.o. male presenting for  follow-up.      03/21/2025 presenting for follow-up status post bronchoscopy with BAL transbronchial biopsies and EBUS TBNA biopsy.  Unremarkable findings.  Following up with kidney transplant team.          12/16 daily cough with whitish yellowish sputum, SOB mild .  In the process of evaluation for kidney transplant.      Chronic interstitial changes and mediastinal lymphadenopathy reported on multiple CT scans since 2019.    09/16/2024 overall stable.  Not on dialysis yet.  CKD stage 4-5.  Status post AV fistula.  He is a kidney transplant possible candidate.      Complains of cough productive of whitish sputum.  Microbiology so far unremarkable but not finalized.      He was initially  referred for pulmonary evaluation part of his pre kidney transplant assessment July 2027.      Never smoker.  History of complicated pleural effusion 2015, history of interstitial abnormalities on CT scan of the chest present in 2019 as well and mediastinal lymphadenopathy.      Denies personal family history of autoimmune disorders.      Does have a cough productive of whitish sputum.      Does have cats and dogs at home.      No personal history of asthma.      Review of Systems   Respiratory:  Positive for cough, sputum production and shortness of breath.        Outpatient Encounter Medications as of 3/21/2025   Medication Sig Dispense Refill    amLODIPine (NORVASC) 5 MG tablet Take 5 mg by mouth once daily.      ELIQUIS 5 mg Tab Take 5 mg by mouth 2 (two) times daily.      ergocalciferol (ERGOCALCIFEROL) 50,000 unit Cap Take by mouth.      FREESTYLE SAVANNAH 3 SENSOR Breann Use to check sugar 4 times a day      furosemide (LASIX) 40 MG tablet Take 40 mg by mouth once daily.      HUMALOG KWIKPEN  "INSULIN 100 unit/mL pen Sliding scale      JARDIANCE 10 mg tablet Take 10 mg by mouth.      rosuvastatin (CRESTOR) 20 MG tablet Take 20 mg by mouth every evening.      traZODone (DESYREL) 50 MG tablet Take 1 tablet by mouth every evening.      sertraline (ZOLOFT) 50 MG tablet Take 1 tablet by mouth once daily.       No facility-administered encounter medications on file as of 3/21/2025.       Objective:     Vital Signs (Most Recent)  Vital Signs  Pulse: (!) 50  Resp: 18  SpO2: 100 %  BP: (!) 150/68  Patient Position: Sitting  Pain Score: 0-No pain  Height and Weight  Height: 6' 1" (185.4 cm)  Weight: 116 kg (255 lb 11.7 oz)  BSA (Calculated - sq m): 2.44 sq meters  BMI (Calculated): 33.7  Weight in (lb) to have BMI = 25: 189.1]  Wt Readings from Last 2 Encounters:   03/21/25 116 kg (255 lb 11.7 oz)   03/21/25 116 kg (255 lb 11.7 oz)       Physical Exam   Constitutional: He is oriented to person, place, and time. He appears well-developed and well-nourished.   Cardiovascular: Normal rate and regular rhythm.   Pulmonary/Chest: Normal expansion and effort normal. No respiratory distress. He has no wheezes.   Neurological: He is alert and oriented to person, place, and time.   Psychiatric: His behavior is normal.       Laboratory  Lab Results   Component Value Date    WBC 7.19 03/14/2024    RBC 4.56 (L) 03/14/2024    HGB 12.9 (L) 03/14/2024    HCT 41.2 03/14/2024    MCV 90 03/14/2024    MCH 28.3 03/14/2024    MCHC 31.3 (L) 03/14/2024    RDW 14.2 03/14/2024     03/14/2024    MPV 9.6 03/14/2024    GRAN 4.6 03/14/2024    GRAN 64.6 03/14/2024    LYMPH 1.8 03/14/2024    LYMPH 24.3 03/14/2024    MONO 0.5 03/14/2024    MONO 7.0 03/14/2024    EOS 0.2 03/14/2024    BASO 0.03 03/14/2024    EOSINOPHIL 3.3 03/14/2024    BASOPHIL 0.4 03/14/2024       BMP  Lab Results   Component Value Date     03/14/2024    K 4.3 03/14/2024     03/14/2024    CO2 24 03/14/2024    BUN 57 (H) 03/14/2024    CREATININE 3.3 (H) " "03/14/2024    CALCIUM 9.2 03/14/2024    ANIONGAP 9 03/14/2024    ESTGFRAFRICA 20 12/06/2023    AST 20 03/14/2024    ALT 15 03/14/2024    PROT 8.3 03/14/2024     Echo April 2024      Left Ventricle: The left ventricle is normal in size. Normal wall thickness. There is normal systolic function. Ejection fraction by visual approximation is 55%. There is normal diastolic function.    Right Ventricle: Mild right ventricular enlargement. Wall thickness is normal. Systolic function is mildly reduced.    Left Atrium: Left atrium is severely dilated.    Tricuspid Valve: There is mild to moderate regurgitation.    Aorta: Aortic root is mildly dilated measuring 4.5 cm. Ascending aorta is normal measuring 3.5 cm.    Pulmonary Artery: There is mild pulmonary hypertension. The estimated pulmonary artery systolic pressure is 52 mmHg.    IVC/SVC: Normal venous pressure at 3 mmHg.  No results found for: "BNP"    Lab Results   Component Value Date    TSH 0.55 02/24/2025       Lab Results   Component Value Date    SEDRATE 35 (H) 11/13/2023       Lab Results   Component Value Date    CRP 1.4 (H) 11/13/2023     No results found for: "IGE"     Lab Results   Component Value Date    ASPERGILLUS Not Detected 08/16/2024     No results found for: "AFUMIGATUSCL"     Lab Results   Component Value Date    ACE 47 08/16/2024        Diagnostic Results:  I have personally reviewed today the following studies:  CT CHEST WITHOUT CONTRAST     CLINICAL HISTORY:  "nodular opacities right lung;"     COMPARISON:  PA and lateral views of the chest dated 03/14/2024.     TECHNIQUE:  Volumetric data acquisition of the chest from the lung apices to the adrenals was obtained without intravenous contrast. Sagittal and coronal multiplanar reconstructions were performed. Lack of IV contrast material limits the assessment of mediastinal and abdominal structures.     FINDINGS:  Lungs and large airways: Cluster of tiny nodularities in the left apical region.  The " minimal ground-glass opacity in the right upper lobe associated with interlobular septal thickening.  Multiple tiny nodularities in the subpleural areas of the right middle and lower lobes and in the left lower lobe associated with minimal subpleural consolidation in the right lung.  Multiple tiny subpleural calcifications/ossifications bilateral lower lungs.  No evidence of pulmonary mass.  No evidence of consolidation.  Trachea and main bronchi are patent     Pleura: No evidence of pneumothorax or pleural effusion.  Thickening of the right pleura.     Heart and pericardium: Cardiac silhouette is mildly enlarged with no evidence of pericardial effusion.     Mediastinum and gwen: Prominent lymph node measuring 14 mm in station 4R.  Other subcentimeter lymph nodes in mediastinum     Chest wall and lower neck: Thyroid gland is normal in size.  No evidence of lymphadenopathy in the axillary regions.     Vessels: Left-sided aortic arch.  Aorta is normal in caliber.  The mild aortic atheromatous calcifications.  The moderate atheromatous calcifications in the coronary arteries.  Prominent main pulmonary artery measuring 38 mm in transverse diameter.     Bones: Degenerative changes in the spine.  No acute bony abnormality is noted     Upper abdomen: Elevation of the right hemidiaphragm.  The liver, spleen and pancreas are normal in size with no evidence of focal lesion.  Multiple gallstones with no evidence of acute cholecystitis.  Right adrenal gland is normal in size.  The clips about the left adrenal gland.  The visualized right kidney is normal in size with no evidence of hydronephrosis.  Left kidney is not visualized in this study.  For more details please refer to the CT of the abdomen and pelvis dated 04/17/2024.     Impression:     1. Cluster of tiny nodularities in the left apical region, minimal ground-glass opacities and interlobular septal thickening in the right upper lobe and multiple tiny nodularities in  the subpleural area of the bilateral lower lungs, right greater than.  Findings may represent infectious/inflammatory process.  2. Tiny subpleural calcifications/ossifications in the bilateral lower lungs may represent infectious process.  These calcifications are also seen in cases of a chronic aspiration.  3. Lymphadenopathy measuring 14 mm in station 4R.  4. Prominent main pulmonary artery measuring 38 mm in transverse diameter may represent pulmonary arterial hypertension.  Clinical correlation is needed.          In lab sleep study PSG, done on 02/14/20, which showed Severe Central predominant SAS (AHI/BRANDIN 73.9 Low SpO2 72 %)     Assessment/Plan:   ILD (interstitial lung disease)    Organ transplant candidate    Central sleep apnea    Mediastinal lymphadenopathy            Stable findings on spirometry and 6 minute walk.      Denies active respiratory complaints.    Overall he is stable he has a moderately severe reduction of DLCO likely related to pulmonary hypertension with systolic pulmonary artery pressure of 52 mm Hg likely related to presence of fistula and  sleep apnea untreated due to severe claustrophobia.      Re-evaluate sleep disordered breathing with in-lab polysomnography due to predominance of CSA on his previous in-lab polysomnography in 2020    I communicated with the sleep lab in order schedule his polysomnography.  Follow up in about 3 months (around 6/21/2025).    This note was prepared using voice recognition system and is likely to have sound alike errors that may have been overlooked even after proof reading.  Please call me with any questions    Discussed diagnosis, its evaluation, treatment and usual course. All questions answered.      Selwyn Alvarez MD

## 2025-03-21 NOTE — PROCEDURES
"[facility] - Pulmonary Function  Six Minute Walk     SUMMARY     Ordering Provider: Antonio BARGER   Interpreting Provider: Antonio BAREGR  Performing nurse/tech/RT: LS RRT  Diagnosis: Interstitial Lung Disease  Height: 6' 1" (185.4 cm)  Weight: 116 kg (255 lb 11.7 oz)  BMI (Calculated): 33.7                Phase Oxygen Assessment Supplemental O2 Heart   Rate Blood Pressure Nida Dyspnea Scale Rating   Resting 99 % Room Air 50 bpm 129/59 2   Exercise        Minute        1 98 % Room Air 60 bpm     2 95 % Room Air 65 bpm     3 96 % Room Air 65 bpm     4 95 % Room Air 87 bpm     5 96 % Room Air 63 bpm     6  96 % Room Air 65 bpm 178/77 4   Recovery        Minute        1 95 % Room Air 60 bpm     2 98 % Room Air 50 bpm     3 98 % Room Air 50 bpm     4 100 % Room Air 50 bpm 150/68 2     Six Minute Walk Summary  6MWT Status: completed without stopping  Patient Reported: No complaints     Interpretation:  Did the patient stop or pause?: No                                         Total Time Walked (Calculated): 360 seconds  Final Partial Lap Distance (feet): 0 feet  Total Distance Meters (Calculated): 304.8 meters  Predicted Distance Meters (Calculated): 574.06 meters  Percentage of Predicted (Calculated): 53.1  Peak VO2 (Calculated): 13.12  Mets: 3.75  Has The Patient Had a Previous Six Minute Walk Test?: Yes       Previous 6MWT Results  Has The Patient Had a Previous Six Minute Walk Test?: Yes  Date of Previous Test: 09/16/24  Total Time Walked: 360 seconds  Total Distance (meters): 365  Predicted Distance (meters): 562 meters  Percentage of Predicted: 65  Percent Change (Calculated): 0.16    "

## 2025-05-13 ENCOUNTER — TELEPHONE (OUTPATIENT)
Dept: ENDOSCOPY | Facility: HOSPITAL | Age: 65
End: 2025-05-13
Payer: COMMERCIAL

## 2025-05-13 ENCOUNTER — PATIENT MESSAGE (OUTPATIENT)
Dept: GASTROENTEROLOGY | Facility: CLINIC | Age: 65
End: 2025-05-13
Payer: COMMERCIAL

## 2025-05-13 DIAGNOSIS — K86.2 PANCREAS CYST: Primary | ICD-10-CM

## 2025-05-16 ENCOUNTER — TELEPHONE (OUTPATIENT)
Dept: PULMONOLOGY | Facility: CLINIC | Age: 65
End: 2025-05-16
Payer: COMMERCIAL

## 2025-05-16 NOTE — TELEPHONE ENCOUNTER
Spoke to pt about appt and needing to be scheduled for a in clinic sleep study message sent to sleep clinic to schedule pt and scheduled appt to see MD

## 2025-05-16 NOTE — TELEPHONE ENCOUNTER
----- Message from Jonelle sent at 5/16/2025 10:05 AM CDT -----  Contact: Leigh  [phone]  Type: Orders RequestWhat orders/ testing are being requested? Sleep studyIs there a future appointment scheduled for the patient with PCP? noWhen? Would you prefer a response via "[employer]"? Call backComments: patient called in this morning to request a sleep study for a cpap. Please calll back  [phone]. Thanks shaka

## 2025-05-28 DIAGNOSIS — Z76.82 ORGAN TRANSPLANT CANDIDATE: ICD-10-CM

## 2025-05-28 DIAGNOSIS — N18.6 END STAGE RENAL DISEASE: Primary | ICD-10-CM

## 2025-06-06 ENCOUNTER — TELEPHONE (OUTPATIENT)
Dept: TRANSPLANT | Facility: CLINIC | Age: 65
End: 2025-06-06
Payer: COMMERCIAL

## 2025-06-10 ENCOUNTER — TELEPHONE (OUTPATIENT)
Dept: TRANSPLANT | Facility: CLINIC | Age: 65
End: 2025-06-10
Payer: COMMERCIAL

## 2025-06-12 ENCOUNTER — TELEPHONE (OUTPATIENT)
Dept: TRANSPLANT | Facility: CLINIC | Age: 65
End: 2025-06-12
Payer: COMMERCIAL

## 2025-06-13 ENCOUNTER — OFFICE VISIT (OUTPATIENT)
Dept: PULMONOLOGY | Facility: CLINIC | Age: 65
End: 2025-06-13
Payer: COMMERCIAL

## 2025-06-13 VITALS
RESPIRATION RATE: 16 BRPM | SYSTOLIC BLOOD PRESSURE: 128 MMHG | HEART RATE: 55 BPM | OXYGEN SATURATION: 92 % | HEIGHT: 73 IN | BODY MASS INDEX: 31.92 KG/M2 | DIASTOLIC BLOOD PRESSURE: 64 MMHG | WEIGHT: 240.88 LBS

## 2025-06-13 DIAGNOSIS — Z76.82 ORGAN TRANSPLANT CANDIDATE: ICD-10-CM

## 2025-06-13 DIAGNOSIS — R59.0 MEDIASTINAL LYMPHADENOPATHY: ICD-10-CM

## 2025-06-13 DIAGNOSIS — G47.31 CENTRAL SLEEP APNEA: Primary | ICD-10-CM

## 2025-06-13 DIAGNOSIS — J84.9 ILD (INTERSTITIAL LUNG DISEASE): ICD-10-CM

## 2025-06-13 DIAGNOSIS — Z98.890 HISTORY OF LUNG BIOPSY: ICD-10-CM

## 2025-06-13 PROCEDURE — 99999 PR PBB SHADOW E&M-EST. PATIENT-LVL IV: CPT | Mod: PBBFAC,TXP,, | Performed by: INTERNAL MEDICINE

## 2025-06-13 RX ORDER — OXYCODONE AND ACETAMINOPHEN 5; 325 MG/1; MG/1
TABLET ORAL
COMMUNITY
Start: 2025-05-14

## 2025-06-13 RX ORDER — CLINDAMYCIN HYDROCHLORIDE 300 MG/1
300 CAPSULE ORAL 2 TIMES DAILY
COMMUNITY
Start: 2025-05-15

## 2025-06-13 RX ORDER — DOXYCYCLINE 100 MG/1
100 CAPSULE ORAL 2 TIMES DAILY
COMMUNITY
Start: 2024-12-27

## 2025-06-13 RX ORDER — ONDANSETRON 4 MG/1
4 TABLET, ORALLY DISINTEGRATING ORAL EVERY 8 HOURS PRN
COMMUNITY
Start: 2025-05-14

## 2025-06-13 RX ORDER — AMOXICILLIN 500 MG/1
TABLET, FILM COATED ORAL
COMMUNITY
Start: 2025-04-17

## 2025-06-13 NOTE — PROGRESS NOTES
"                                           Pulmonary Outpatient Follow Up Visit     Subjective:    PSG, done on 02/14/20, which showed Severe Central predominant SAS (AHI/BRANDIN 73.9 Low SpO2 72 %).    Patient ID: Bhupendra Rojas is a 65 y.o. male.    Chief Complaint: Interstitial Lung Disease and Central Sleep Apnea        History of Present Illness    CHIEF COMPLAINT:  Patient presents today for follow up of breathing issues    RESPIRATORY:  He reports breathing is "adequate" with occasional dyspnea when walking across parking lots. Previous exercise test demonstrated 50% exercise capacity without oxygen desaturation, maintaining 95% SpO2 at [facility]. He denies chronic coughing or wheezing. He notes minimal phlegm, particularly when supine, which he attributes to fluid redistribution from his legs to chest. He is not currently using any inhalers. Lung biopsy was unremarkable, showing normal findings in two lymph node stations with granulomas present.    SLEEP:  He has a history of central sleep apnea. He previously used CPAP therapy but discontinued due to device recall and was unable to obtain replacement due to nationwide recall waitlist.    RENAL:  He is not currently on dialysis. His fistula was recently revised and relocated due to inadequate function and will be ready for use in two weeks if dialysis becomes necessary.    MUSCULOSKELETAL:  His previously injured great toe has healed, and his second affected toe is showing improvement.                   Review of Systems   Respiratory:  Positive for cough, sputum production and shortness of breath.        Encounter Medications[1]    Objective:     Vital Signs (Most Recent)  Vital Signs  Pulse: (!) 55  Resp: 16  SpO2: (!) 92 %  BP: 128/64  Pain Score: 0-No pain  Height and Weight  Height: 6' 1" (185.4 cm)  Weight: 109.2 kg (240 lb 13.6 oz)  BSA (Calculated - sq m): 2.37 sq meters  BMI (Calculated): 31.8  Weight in (lb) to have BMI = 25: 189.1]  Wt Readings from Last 2 " "Encounters:   06/13/25 109.2 kg (240 lb 13.6 oz)   03/21/25 116 kg (255 lb 11.7 oz)       Physical Exam   Constitutional: He is oriented to person, place, and time. He appears well-developed and well-nourished.   Cardiovascular: Normal rate and regular rhythm.   Pulmonary/Chest: Normal expansion and effort normal. No respiratory distress. He has no wheezes.   Neurological: He is alert and oriented to person, place, and time.   Psychiatric: His behavior is normal.       Laboratory  Lab Results   Component Value Date    WBC 7.19 03/14/2024    RBC 4.56 (L) 03/14/2024    HGB 12.9 (L) 03/14/2024    HCT 41.2 03/14/2024    MCV 90 03/14/2024    MCH 28.3 03/14/2024    MCHC 31.3 (L) 03/14/2024    RDW 14.2 03/14/2024     03/14/2024    MPV 9.6 03/14/2024    GRAN 4.6 03/14/2024    GRAN 64.6 03/14/2024    LYMPH 1.8 03/14/2024    LYMPH 24.3 03/14/2024    MONO 0.5 03/14/2024    MONO 7.0 03/14/2024    EOS 0.2 03/14/2024    BASO 0.03 03/14/2024    EOSINOPHIL 3.3 03/14/2024    BASOPHIL 0.4 03/14/2024       BMP  Lab Results   Component Value Date     03/14/2024    K 3.8 05/14/2025     03/14/2024    CO2 24 03/14/2024    BUN 57 (H) 03/14/2024    CREATININE 3.3 (H) 03/14/2024    CALCIUM 9.2 03/14/2024    ANIONGAP 9 03/14/2024    ESTGFRAFRICA 20 12/06/2023    AST 20 03/14/2024    ALT 15 03/14/2024    PROT 8.3 03/14/2024       No results found for: "BNP"    Lab Results   Component Value Date    TSH 0.55 02/24/2025       Lab Results   Component Value Date    SEDRATE 35 (H) 11/13/2023       Lab Results   Component Value Date    CRP 1.4 (H) 11/13/2023     No results found for: "IGE"     Lab Results   Component Value Date    ASPERGILLUS Not Detected 08/16/2024     No results found for: "AFUMIGATUSCL"     Lab Results   Component Value Date    ACE 47 08/16/2024        Diagnostic Results:  I have personally reviewed today the following studies:  As above    Assessment/Plan:   Central sleep apnea  -     Polysomnogram (CPAP will " be added if patient meets diagnostic criteria.); Future    ILD (interstitial lung disease)  -     Spirometry with/without bronchodilator & DLCO; Future; Expected date: 12/13/2025    Mediastinal lymphadenopathy    Organ transplant candidate    History of lung biopsy with unremarkable findings      Assessment & Plan    J84.9 ILD (interstitial lung disease)  G47.31 Central sleep apnea  R59.0 Mediastinal lymphadenopathy  Z76.82 Organ transplant candidate  Z98.890 History of lung biopsy    IMPRESSION:  - Lung biopsy results unremarkable, with normal findings in 2 lymph node stations and no concerning granulomas.  - Previous physician note from 2020 indicated central sleep apnea, which requires different treatment approach than obstructive sleep apnea.  - Remains stable from a pulmonary perspective; no current need to delay potential kidney transplant based on lung findings.  - Current scan findings are not concerning; future imaging will be considered if spirometry shows decreased lung function.    ILD (INTERSTITIAL LUNG DISEASE):  - Will perform mini spirometry at next visit to monitor lung function.  - Follow up in 6 months for mini spirometry testing to monitor lung function.    CENTRAL SLEEP APNEA:  - Explained the difference between central sleep apnea and regular sleep apnea, noting that central sleep apnea requires a different treatment approach with a machine similar to CPAP but works differently.  - Ordered sleep study for evaluation of central sleep apnea.       Stable from respiratory standpoint to proceed with transplant.  Follow up in about 6 months (around 12/13/2025).    This note was prepared using voice recognition system and is likely to have sound alike errors that may have been overlooked even after proof reading.  Please call me with any questions    Discussed diagnosis, its evaluation, treatment and usual course. All questions answered.      Selwyn Alvarez MD       [1]   Outpatient Encounter  Medications as of 2025   Medication Sig Dispense Refill    amLODIPine (NORVASC) 5 MG tablet Take 5 mg by mouth once daily.      amoxicillin (AMOXIL) 500 MG Tab SMARTSI Tablet(s) By Mouth Morning-Night      clindamycin (CLEOCIN) 300 MG capsule Take 300 mg by mouth 2 (two) times daily.      doxycycline (MONODOX) 100 MG capsule Take 100 mg by mouth 2 (two) times daily.      ELIQUIS 5 mg Tab Take 5 mg by mouth 2 (two) times daily.      ergocalciferol (ERGOCALCIFEROL) 50,000 unit Cap Take by mouth.      FREESTYLE SAVANNAH 3 SENSOR Breann Use to check sugar 4 times a day      furosemide (LASIX) 40 MG tablet Take 40 mg by mouth once daily.      HUMALOG KWIKPEN INSULIN 100 unit/mL pen Sliding scale      JARDIANCE 10 mg tablet Take 10 mg by mouth.      ondansetron (ZOFRAN-ODT) 4 MG TbDL Take 4 mg by mouth every 8 (eight) hours as needed.      oxyCODONE-acetaminophen (PERCOCET) 5-325 mg per tablet Take by mouth.      rosuvastatin (CRESTOR) 20 MG tablet Take 20 mg by mouth every evening.      traZODone (DESYREL) 50 MG tablet Take 1 tablet by mouth every evening.      sertraline (ZOLOFT) 50 MG tablet Take 1 tablet by mouth once daily.       No facility-administered encounter medications on file as of 2025.

## 2025-06-24 ENCOUNTER — TELEPHONE (OUTPATIENT)
Dept: TRANSPLANT | Facility: CLINIC | Age: 65
End: 2025-06-24
Payer: COMMERCIAL

## 2025-06-25 ENCOUNTER — TELEPHONE (OUTPATIENT)
Dept: TRANSPLANT | Facility: CLINIC | Age: 65
End: 2025-06-25
Payer: COMMERCIAL

## 2025-07-01 ENCOUNTER — TELEPHONE (OUTPATIENT)
Dept: TRANSPLANT | Facility: CLINIC | Age: 65
End: 2025-07-01
Payer: COMMERCIAL

## 2025-07-08 ENCOUNTER — TELEPHONE (OUTPATIENT)
Dept: TRANSPLANT | Facility: CLINIC | Age: 65
End: 2025-07-08
Payer: COMMERCIAL

## 2025-08-03 ENCOUNTER — HOSPITAL ENCOUNTER (OUTPATIENT)
Dept: SLEEP MEDICINE | Facility: HOSPITAL | Age: 65
Discharge: HOME | End: 2025-08-03
Attending: INTERNAL MEDICINE
Payer: COMMERCIAL

## 2025-08-03 DIAGNOSIS — G47.31 CENTRAL SLEEP APNEA: ICD-10-CM

## 2025-08-03 PROCEDURE — 95811 POLYSOM 6/>YRS CPAP 4/> PARM: CPT | Mod: TXP

## 2025-08-04 PROBLEM — G47.31 CENTRAL SLEEP APNEA: Status: ACTIVE | Noted: 2025-08-04

## 2025-08-17 DIAGNOSIS — G47.31 CENTRAL SLEEP APNEA: Primary | ICD-10-CM

## 2025-08-20 ENCOUNTER — HOSPITAL ENCOUNTER (OUTPATIENT)
Dept: SLEEP MEDICINE | Facility: HOSPITAL | Age: 65
Discharge: HOME | End: 2025-08-20
Attending: INTERNAL MEDICINE
Payer: COMMERCIAL

## 2025-08-20 DIAGNOSIS — G47.31 CENTRAL SLEEP APNEA: ICD-10-CM

## 2025-08-20 PROCEDURE — 95811 POLYSOM 6/>YRS CPAP 4/> PARM: CPT | Mod: TXP

## 2025-09-03 ENCOUNTER — TELEPHONE (OUTPATIENT)
Dept: GASTROENTEROLOGY | Facility: CLINIC | Age: 65
End: 2025-09-03
Payer: COMMERCIAL